# Patient Record
Sex: MALE | Race: AMERICAN INDIAN OR ALASKA NATIVE | NOT HISPANIC OR LATINO | Employment: UNEMPLOYED | URBAN - METROPOLITAN AREA
[De-identification: names, ages, dates, MRNs, and addresses within clinical notes are randomized per-mention and may not be internally consistent; named-entity substitution may affect disease eponyms.]

---

## 2017-08-15 ENCOUNTER — HOSPITAL ENCOUNTER (EMERGENCY)
Facility: MEDICAL CENTER | Age: 52
End: 2017-08-16
Attending: EMERGENCY MEDICINE
Payer: OTHER GOVERNMENT

## 2017-08-15 DIAGNOSIS — R11.2 NON-INTRACTABLE VOMITING WITH NAUSEA, UNSPECIFIED VOMITING TYPE: ICD-10-CM

## 2017-08-15 DIAGNOSIS — R10.10 PAIN OF UPPER ABDOMEN: ICD-10-CM

## 2017-08-15 PROCEDURE — 99284 EMERGENCY DEPT VISIT MOD MDM: CPT

## 2017-08-15 ASSESSMENT — PAIN SCALES - GENERAL: PAINLEVEL_OUTOF10: 9

## 2017-08-15 NOTE — ED AVS SNAPSHOT
Home Care Instructions                                                                                                                Reji Diana   MRN: [id number]    Department:  [facility], Emergency Dept   Date of Visit:  8/15/2017            [facility], Emergency Dept    [address] [place] [id number]    Phone:  [phone]      You were seen by     Allie Garcia M.D.      Your Diagnosis Was     Pain of upper abdomen     R10.10       These are the medications you received during your hospitalization from 08/15/2017 2313 to 08/16/2017 0318     Date/Time Order Dose Route Action    08/16/2017 0217 NS infusion 1,000 mL 1,000 mL Intravenous New Bag    08/16/2017 0213 HYDROmorphone (DILAUDID) injection 1 mg 1 mg Intravenous Given    08/16/2017 0213 ondansetron (ZOFRAN) syringe/vial injection 4 mg 4 mg Intravenous Given      Follow-up Information     1. Call [facility].    Why:  Please call at 8:00 this morning for follow-up appointment    Contact information    [address] [place] [address] [phone]        2. Go to [facility], Emergency Dept.    Specialty:  Emergency Medicine    Why:  If symptoms worsen    Contact information    [address] [place] [id number] [phone]      Medication Information     Review all of your home medications and newly ordered medications with your primary doctor and/or pharmacist as soon as possible. Follow medication instructions as directed by your doctor and/or pharmacist.     Please keep your complete medication list with you and share with your physician. Update the information when medications are discontinued, doses are changed, or new medications (including over-the-counter products) are added; and carry medication information at all times in the event of emergency situations.               Medication List      ASK your doctor about these medications        Instructions    Morning Afternoon Evening Bedtime    azithromycin 250 MG Tabs   Commonly known as:  ZITHROMAX        1 po q day for 4 days                        benzonatate 200 MG capsule   Commonly known as:  TESSALON        Take 1 Cap by mouth 3 times a day as needed for Cough.   Dose:  200 mg                        clindamycin 150 MG Caps   Commonly known as:  CLEOCIN        Take 1 Cap by mouth 4 times a day.   Dose:  150 mg                        * hydrocodone-acetaminophen 7.5-750 MG per tablet   Commonly known as:  VICODIN ES        Take 1 Tab by mouth every four hours as needed for Mild Pain (pain).   Dose:  1 Tab                        * hydrocodone-acetaminophen 5-325 MG Tabs per tablet   Commonly known as:  NORCO        Take 1-2 Tabs by mouth every 6 hours as needed.   Dose:  1-2 Tab                        * hydrocodone-acetaminophen 5-325 MG Tabs per tablet   Commonly known as:  NORCO        Take 1-2 Tabs by mouth every 6 hours as needed.   Dose:  1-2 Tab                        * Notice:  This list has 3 medication(s) that are the same as other medications prescribed for you. Read the directions carefully, and ask your doctor or other care provider to review them with you.            Procedures and tests performed during your visit     CBC WITH DIFFERENTIAL    COMP METABOLIC PANEL    CREATINE KINASE    ESTIMATED GFR    IV Saline Lock    LIPASE        Discharge Instructions       Please call your primary care clinic this morning for follow-up appointment and complete recheck within 24-48 hours. Return to the emergency department if he develops worsening symptoms including recurrent abdominal pain, or vomiting returns, if he develops fevers, or if you develop any new or worsening symptoms.      Abdominal Pain, Adult  Many things can cause belly (abdominal) pain. Most times, the belly pain is not dangerous. Many cases of belly pain can be watched and treated at home.  HOME CARE   · Do not take medicines that help you go  poop (laxatives) unless told to by your doctor.  · Only take medicine as told by your doctor.  · Eat or drink as told by your doctor. Your doctor will tell you if you should be on a special diet.  GET HELP IF:  · You do not know what is causing your belly pain.  · You have belly pain while you are sick to your stomach (nauseous) or have runny poop (diarrhea).  · You have pain while you pee or poop.  · Your belly pain wakes you up at night.  · You have belly pain that gets worse or better when you eat.  · You have belly pain that gets worse when you eat fatty foods.  · You have a fever.  GET HELP RIGHT AWAY IF:   · The pain does not go away within 2 hours.  · You keep throwing up (vomiting).  · The pain changes and is only in the right or left part of the belly.  · You have bloody or tarry looking poop.  MAKE SURE YOU:   · Understand these instructions.  · Will watch your condition.  · Will get help right away if you are not doing well or get worse.     This information is not intended to replace advice given to you by your health care provider. Make sure you discuss any questions you have with your health care provider.     Document Released: 06/05/2009 Document Revised: 01/08/2016 Document Reviewed: 08/27/2014  [employer] Interactive Patient Education ©2016 [employer]            Patient Information     Patient Information    Following emergency treatment: all patient requiring follow-up care must return either to a private physician or a clinic if your condition worsens before you are able to obtain further medical attention, please return to the emergency room.     Billing Information    At [facility], we work to make the billing process streamlined for our patients.  Our Representatives are here to answer any questions you may have regarding your hospital bill.  If you have insurance coverage and have supplied your insurance information to us, we will submit a claim to your insurer on your behalf.  Should you  have any questions regarding your bill, we can be reached online or by phone as follows:  Online: You are able pay your bills online or live chat with our representatives about any billing questions you may have. We are here to help Monday - Friday from 8:00am to 7:30pm and 9:00am - 12:00pm on Saturdays.  Please visit [email or web address]  for more information.   Phone:  [phone] or [phone]    Please note that your emergency physician, surgeon, pathologist, radiologist, anesthesiologist, and other specialists are not employed by [facility] and will therefore bill separately for their services.  Please contact them directly for any questions concerning their bills at the numbers below:     Emergency Physician Services:  [phone]  [facility]:  [phone]  [facility]:  [phone]  [facility]:  [phone]    1. Your final bill may vary from the amount quoted upon discharge if all procedures are not complete at that time, or if your doctor has additional procedures of which we are not aware. You will receive an additional bill if you return to the Emergency Department at [facility] for suture removal regardless of the facility of which the sutures were placed.     2. Please arrange for settlement of this account at the emergency registration.    3. All self-pay accounts are due in full at the time of treatment.  If you are unable to meet this obligation then payment is expected within 4-5 days.     4. If you have had radiology studies (CT, X-ray, Ultrasound, MRI), you have received a preliminary result during your emergency department visit. Please contact the radiology department [phone] to receive a copy of your final result. Please discuss the Final result with your primary physician or with the follow up physician provided.     Crisis Hotline:  National Crisis Hotline:  8-320-USSPZFS or  1-870.392.5074  [place] Crisis Hotline:    1-798.156.8670 or 759-974-8708         ED Discharge Follow Up Questions    1. In order to provide you with very good care, we would like to follow up with a phone call in the next few days.  May we have your permission to contact you?     YES /  NO    2. What is the best phone number to call you? (       )_____-__________    3. What is the best time to call you?      Morning  /  Afternoon  /  Evening                   Patient Signature:  ____________________________________________________________    Date:  ____________________________________________________________

## 2017-08-15 NOTE — ED AVS SNAPSHOT
cityguru Access Code: [email or web address]  Expires: 9/15/2017  3:17 AM    cityguru  A secure, online tool to manage your health information     [employer]’s cityguru® is a secure, online tool that connects you to your personalized health information from the privacy of your home -- day or night - making it very easy for you to manage your healthcare. Once the activation process is completed, you can even access your medical information using the cityguru kit, which is available for free in the Apple Kit store or Google Play store.     cityguru provides the following levels of access (as shown below):   My Chart Features   [facility] Primary Care Doctor [facility]  Specialists [facility]  Urgent  Care Non-[facility]  Primary Care  Doctor   Email your healthcare team securely and privately 24/7 X X X X   Manage appointments: schedule your next appointment; view details of past/upcoming appointments X      Request prescription refills. X      View recent personal medical records, including lab and immunizations X X X X   View health record, including health history, allergies, medications X X X X   Read reports about your outpatient visits, procedures, consult and ER notes X X X X   See your discharge summary, which is a recap of your hospital and/or ER visit that includes your diagnosis, lab results, and care plan. X X       How to register for cityguru:  1. Go to  [email or web address].  2. Click on the Sign Up Now box, which takes you to the New Member Sign Up page. You will need to provide the following information:  a. Enter your cityguru Access Code exactly as it appears at the top of this page. (You will not need to use this code after you’ve completed the sign-up process. If you do not sign up before the expiration date, you must request a new code.)   b. Enter your date of birth.   c. Enter your home email address.   d. Click Submit, and follow the next screen’s instructions.  3. Create a cityguru ID. This will be your cityguru  login ID and cannot be changed, so think of one that is secure and easy to remember.  4. Create a [employer] password. You can change your password at any time.  5. Enter your Password Reset Question and Answer. This can be used at a later time if you forget your password.   6. Enter your e-mail address. This allows you to receive e-mail notifications when new information is available in [employer].  7. Click Sign Up. You can now view your health information.    For assistance activating your [employer] account, call [phone]

## 2017-08-15 NOTE — ED AVS SNAPSHOT
8/16/2017    Reji Diana  [address] [address]    Dear Reji:    [facility] wants to ensure your discharge home is safe and you or your loved ones have had all of your questions answered regarding your care after you leave the hospital.    Below is a list of resources and contact information should you have any questions regarding your hospital stay, follow-up instructions, or active medical symptoms.    Questions or Concerns Regarding… Contact   Medical Questions Related to Your Discharge  (7 days a week, 8am-5pm) Contact a Nurse Care Coordinator   [phone]   Medical Questions Not Related to Your Discharge  (24 hours a day / 7 days a week)  Contact the Nurse Health Line   [phone]    Medications or Discharge Instructions Refer to your discharge packet   or contact your [facility] Primary Care Provider   [phone]   Follow-up Appointment(s) Schedule your appointment via [employer]   or contact Scheduling [phone]   Billing Review your statement via [employer]  or contact Billing [phone]   Medical Records Review your records via [employer]   or contact Medical Records [phone]     You may receive a telephone call within two days of discharge. This call is to make certain you understand your discharge instructions and have the opportunity to have any questions answered. You can also easily access your medical information, test results and upcoming appointments via the [employer] free online health management tool. You can learn more and sign up at [employer]. For assistance setting up your [employer] account, please call [phone].    Once again, we want to ensure your discharge home is safe and that you have a clear understanding of any next steps in your care. If you have any questions or concerns, please do not hesitate to contact us, we are here for you. Thank you for choosing [facility] for your healthcare needs.    Sincerely,    Your [facility] Healthcare Team

## 2017-08-16 VITALS
WEIGHT: 154.54 LBS | RESPIRATION RATE: 18 BRPM | HEART RATE: 91 BPM | OXYGEN SATURATION: 94 % | HEIGHT: 72 IN | BODY MASS INDEX: 20.93 KG/M2 | DIASTOLIC BLOOD PRESSURE: 87 MMHG | SYSTOLIC BLOOD PRESSURE: 149 MMHG | TEMPERATURE: 97.6 F

## 2017-08-16 LAB
ALBUMIN SERPL BCP-MCNC: 3.8 G/DL (ref 3.2–4.9)
ALBUMIN/GLOB SERPL: 1.1 G/DL
ALP SERPL-CCNC: 85 U/L (ref 30–99)
ALT SERPL-CCNC: 47 U/L (ref 2–50)
ANION GAP SERPL CALC-SCNC: 4 MMOL/L (ref 0–11.9)
AST SERPL-CCNC: 39 U/L (ref 12–45)
BASOPHILS # BLD AUTO: 0.2 % (ref 0–1.8)
BASOPHILS # BLD: 0.02 K/UL (ref 0–0.12)
BILIRUB SERPL-MCNC: 0.8 MG/DL (ref 0.1–1.5)
BUN SERPL-MCNC: 22 MG/DL (ref 8–22)
CALCIUM SERPL-MCNC: 9.3 MG/DL (ref 8.5–10.5)
CHLORIDE SERPL-SCNC: 108 MMOL/L (ref 96–112)
CK SERPL-CCNC: 57 U/L (ref 0–154)
CO2 SERPL-SCNC: 26 MMOL/L (ref 20–33)
CREAT SERPL-MCNC: 0.73 MG/DL (ref 0.5–1.4)
EOSINOPHIL # BLD AUTO: 0.05 K/UL (ref 0–0.51)
EOSINOPHIL NFR BLD: 0.4 % (ref 0–6.9)
ERYTHROCYTE [DISTWIDTH] IN BLOOD BY AUTOMATED COUNT: 43.2 FL (ref 35.9–50)
GFR SERPL CREATININE-BSD FRML MDRD: >60 ML/MIN/1.73 M 2
GLOBULIN SER CALC-MCNC: 3.4 G/DL (ref 1.9–3.5)
GLUCOSE SERPL-MCNC: 108 MG/DL (ref 65–99)
HCT VFR BLD AUTO: 44.9 % (ref 42–52)
HGB BLD-MCNC: 15.3 G/DL (ref 14–18)
IMM GRANULOCYTES # BLD AUTO: 0.07 K/UL (ref 0–0.11)
IMM GRANULOCYTES NFR BLD AUTO: 0.6 % (ref 0–0.9)
LIPASE SERPL-CCNC: 35 U/L (ref 11–82)
LYMPHOCYTES # BLD AUTO: 0.83 K/UL (ref 1–4.8)
LYMPHOCYTES NFR BLD: 6.6 % (ref 22–41)
MCH RBC QN AUTO: 30.4 PG (ref 27–33)
MCHC RBC AUTO-ENTMCNC: 34.1 G/DL (ref 33.7–35.3)
MCV RBC AUTO: 89.3 FL (ref 81.4–97.8)
MONOCYTES # BLD AUTO: 0.7 K/UL (ref 0–0.85)
MONOCYTES NFR BLD AUTO: 5.6 % (ref 0–13.4)
NEUTROPHILS # BLD AUTO: 10.9 K/UL (ref 1.82–7.42)
NEUTROPHILS NFR BLD: 86.6 % (ref 44–72)
NRBC # BLD AUTO: 0 K/UL
NRBC BLD AUTO-RTO: 0 /100 WBC
PLATELET # BLD AUTO: 171 K/UL (ref 164–446)
PMV BLD AUTO: 11.3 FL (ref 9–12.9)
POTASSIUM SERPL-SCNC: 5 MMOL/L (ref 3.6–5.5)
PROT SERPL-MCNC: 7.2 G/DL (ref 6–8.2)
RBC # BLD AUTO: 5.03 M/UL (ref 4.7–6.1)
SODIUM SERPL-SCNC: 138 MMOL/L (ref 135–145)
WBC # BLD AUTO: 12.6 K/UL (ref 4.8–10.8)

## 2017-08-16 PROCEDURE — 82550 ASSAY OF CK (CPK): CPT

## 2017-08-16 PROCEDURE — 700111 HCHG RX REV CODE 636 W/ 250 OVERRIDE (IP): Performed by: EMERGENCY MEDICINE

## 2017-08-16 PROCEDURE — 83690 ASSAY OF LIPASE: CPT

## 2017-08-16 PROCEDURE — 96375 TX/PRO/DX INJ NEW DRUG ADDON: CPT

## 2017-08-16 PROCEDURE — 85025 COMPLETE CBC W/AUTO DIFF WBC: CPT

## 2017-08-16 PROCEDURE — 96374 THER/PROPH/DIAG INJ IV PUSH: CPT

## 2017-08-16 PROCEDURE — 700105 HCHG RX REV CODE 258: Performed by: EMERGENCY MEDICINE

## 2017-08-16 PROCEDURE — 80053 COMPREHEN METABOLIC PANEL: CPT

## 2017-08-16 RX ORDER — ONDANSETRON 2 MG/ML
4 INJECTION INTRAMUSCULAR; INTRAVENOUS ONCE
Status: COMPLETED | OUTPATIENT
Start: 2017-08-16 | End: 2017-08-16

## 2017-08-16 RX ORDER — SODIUM CHLORIDE 9 MG/ML
1000 INJECTION, SOLUTION INTRAVENOUS ONCE
Status: COMPLETED | OUTPATIENT
Start: 2017-08-16 | End: 2017-08-16

## 2017-08-16 RX ADMIN — ONDANSETRON 4 MG: 2 INJECTION INTRAMUSCULAR; INTRAVENOUS at 02:13

## 2017-08-16 RX ADMIN — SODIUM CHLORIDE 1000 ML: 9 INJECTION, SOLUTION INTRAVENOUS at 02:17

## 2017-08-16 RX ADMIN — HYDROMORPHONE HYDROCHLORIDE 1 MG: 1 INJECTION, SOLUTION INTRAMUSCULAR; INTRAVENOUS; SUBCUTANEOUS at 02:13

## 2017-08-16 NOTE — ED PROVIDER NOTES
"ED Provider Note    Scribed for Allie Garcia M.D. by Desmond Boyer. 8/16/2017, 1:26 AM.    Primary care provider: Pcp Pt States None  Means of arrival: walk-in  History obtained from: patient, patient's friend  History limited by: none    CHIEF COMPLAINT  Chief Complaint   Patient presents with   • Epigastric Pain     since aprox 2100. 9/10 \"twisting\" nonradiating pain   • Vomiting     Pt reports aprox 6-7 episodes of emesis       HPI  Reji Diana is a 52 y.o. male who presents to the Emergency Department for evaluation of abdominal pain onset 9:00 PM tonight. Per patient, his pain was sudden onset. His pain is across his entire abdomen, same on the left and right side of his abdomen. The patient rates his current pain as severe, and describes it as a \"twisting\" pain. He endorses assocaited nausea and vomiting. The patient reports laying on his side improves his pain, but does not endorse any exacerbating factors. He has never experienced this type of abdominal pain. Patient's friend notes the patient has been in the sun a lot recently. The patient confirms he has been urinating normally. Patient does not take any daily medications. He denies fevers, diarrhea, rash. The patient also denies marijuana use.    REVIEW OF SYSTEMS  Pertinent positives include abdominal pain, nausea, vomiting. Pertinent negatives include no marijuana use, fevers, diarrhea, rash. See HPI for further details. All other systems reviewed and negative.    C.    PAST MEDICAL HISTORY   has a past medical history of Seizure disorder (CMS-HCC).    SURGICAL HISTORY  patient denies any surgical history    SOCIAL HISTORY  Social History   Substance Use Topics   • Smoking status: Current Every Day Smoker -- 0.50 packs/day   • Smokeless tobacco: None      Comment: 1/2 ppd   • Alcohol Use: No      History   Drug Use   • Yes   • Special: Inhaled     Comment: meth       FAMILY HISTORY  History reviewed. No pertinent family history.    CURRENT " "MEDICATIONS  No current facility-administered medications on file prior to encounter.     Current Outpatient Prescriptions on File Prior to Encounter   Medication Sig Dispense Refill   • azithromycin (ZITHROMAX) 250 MG Tab 1 po q day for 4 days 4 Tab 0   • benzonatate (TESSALON) 200 MG capsule Take 1 Cap by mouth 3 times a day as needed for Cough. 30 Cap 1   • hydrocodone-acetaminophen (NORCO) 5-325 MG Tab per tablet Take 1-2 Tabs by mouth every 6 hours as needed. 20 Tab 0   • hydrocodone-acetaminophen (NORCO) 5-325 MG Tab per tablet Take 1-2 Tabs by mouth every 6 hours as needed. 20 Tab 0   • clindamycin (CLEOCIN) 150 MG CAPS Take 1 Cap by mouth 4 times a day. 28 Cap 0   • hydrocodone-acetaminophen (VICODIN ES) 7.5-750 MG per tablet Take 1 Tab by mouth every four hours as needed for Mild Pain (pain). 15 Each 0      ALLERGIES  No Known Allergies    PHYSICAL EXAM  Vital Signs: /87 mmHg  Pulse 115  Temp(Src) 36.4 °C (97.6 °F) (Temporal)  Resp 22  Ht 1.829 m (6' 0.01\")  Wt 70.1 kg (154 lb 8.7 oz)  BMI 20.96 kg/m2  SpO2 99%  Constitutional: Alert, no acute distress   HENT: Normocephalic, atraumatic, moist mucus membranes  Eyes: Pupils equal and reactive, normal conjunctiva, non-icteric  Neck: Supple, normal range of motion, no stridor  Cardiovascular: Tachycardic rate, Regular rhythm, Normal peripheral perfusion, no cyanosis, Normal cardiac auscultation  Pulmonary: No respiratory distress, normal work of breathing, no accessory muscle usage, Clear to auscultation bilaterally  Abdomen: Soft, moderate tenderness to palpation in right upper, left upper, and epigastric areas. no RLQ tenderness. No peritoneal signs, bowel sounds are present.   Skin: Warm, dry, no rashes or lesions  Back: No pain with active range of motion  Musculoskeletal: Normal range of motion in all extremities, no swelling or deformity noted  Neurologic: Alert, oriented, normal motor function, no speech deficits  Psychiatric: Normal and " appropriate mood and affect    DIAGNOSTIC STUDIES/PROCEDURES:    LABS  Labs Reviewed   CBC WITH DIFFERENTIAL - Abnormal; Notable for the following:     WBC 12.6 (*)     Neutrophils-Polys 86.60 (*)     Lymphocytes 6.60 (*)     Neutrophils (Absolute) 10.90 (*)     Lymphs (Absolute) 0.83 (*)     All other components within normal limits   COMP METABOLIC PANEL - Abnormal; Notable for the following:     Glucose 108 (*)     All other components within normal limits   LIPASE   CREATINE KINASE   ESTIMATED GFR   URINALYSIS,CULTURE IF INDICATED     All labs reviewed by me.    COURSE & MEDICAL DECISION MAKING  Pertinent Labs & Imaging studies reviewed. (See chart for details)    Differential diagnoses include but are not limited to: Pancreatitis, cholecystitis, cholelithiasis, viral illness, gastroenteritis, dehydration, likely abnormality    1:05 AM - Patient seen and evaluated at bedside. Patient will be treated with 1000 mL NS infusion, 1 mg Dilaudid, 4 mg Zofran. The patient is given IV fluids secondary to clinical signs of dehydration. Ordered CBC with differential, CMP, Lipase, UA culture if indicated, Creatine Kinase to evaluate the patient's symptoms. I discussed the treatment plan as above with the patient. He understood and verbalized agreement.      Decision Making:  This is a 52 y.o. year old male who presents with epigastric pain nausea and vomiting. Pain is localized to the upper abdomen, reproducible on palpation, no chest pain, no shortness of breath, doubt cardiopulmonary etiology. He does have some mild discomfort on abdominal palpation but no peritoneal signs, no masses suggestive of hernia. He has no overlying skin changes. No right lower quadrant tenderness to palpation, less concerning for appendicitis. Pain is not localized to the right upper quadrant, less concerning for cholecystitis or cholelithiasis.    On laboratory evaluation is a mildly elevated white blood count of 12.6, no bands resulted this  time. No significant electrolyte abnormalities, glucose is within normal limits at 108. I cases 35 without evidence of pancreatitis. CPK is 57 without evidence of rhabdomyolysis. Normal liver enzymes, normal total bilirubin, no evidence of obstructive biliary process.    He was treated with a fluid bolus, Zofran and Dilaudid on arrival to the emergency department. On my reassessment his pain has resolved. He is able to tolerate by mouth fluids, states that he is now asymptomatic. Repeat abdominal exam remains benign. At this time, etiology of his abdominal pain is unclear. Resting heart rate is 92 on my reassessment. He remains without hypotension, is afebrile.    Plan at this time is for discharge home with primary care follow-up. He will contact his primary care physician today for abdominal recheck within 24-48 hours. He will return to the emergency department with any new or worsening symptoms, he will return immediately for abdominal recheck if his pain recurs, or if he develops fevers, recurrent vomiting or any further concerns.    Discharge home in stable condition    FINAL IMPRESSION  1. Pain of upper abdomen    2. Non-intractable vomiting with nausea, unspecified vomiting type          I, Desmond Boyer (Scribe), am scribing for, and in the presence of, Allie Garcia M.D..    Electronically signed by: Desmond Boyer (Juan Carlos), 8/16/2017    IAllie M.D. personally performed the services described in this documentation, as scribed by Desmond Boyer in my presence, and it is both accurate and complete.    The note accurately reflects work and decisions made by me.  Allie Garcia  8/16/2017  3:07 AM

## 2017-08-16 NOTE — DISCHARGE INSTRUCTIONS
Please call your primary care clinic this morning for follow-up appointment and complete recheck within 24-48 hours. Return to the emergency department if he develops worsening symptoms including recurrent abdominal pain, or vomiting returns, if he develops fevers, or if you develop any new or worsening symptoms.      Abdominal Pain, Adult  Many things can cause belly (abdominal) pain. Most times, the belly pain is not dangerous. Many cases of belly pain can be watched and treated at home.  HOME CARE   · Do not take medicines that help you go poop (laxatives) unless told to by your doctor.  · Only take medicine as told by your doctor.  · Eat or drink as told by your doctor. Your doctor will tell you if you should be on a special diet.  GET HELP IF:  · You do not know what is causing your belly pain.  · You have belly pain while you are sick to your stomach (nauseous) or have runny poop (diarrhea).  · You have pain while you pee or poop.  · Your belly pain wakes you up at night.  · You have belly pain that gets worse or better when you eat.  · You have belly pain that gets worse when you eat fatty foods.  · You have a fever.  GET HELP RIGHT AWAY IF:   · The pain does not go away within 2 hours.  · You keep throwing up (vomiting).  · The pain changes and is only in the right or left part of the belly.  · You have bloody or tarry looking poop.  MAKE SURE YOU:   · Understand these instructions.  · Will watch your condition.  · Will get help right away if you are not doing well or get worse.     This information is not intended to replace advice given to you by your health care provider. Make sure you discuss any questions you have with your health care provider.     Document Released: 06/05/2009 Document Revised: 01/08/2016 Document Reviewed: 08/27/2014  [employer] Interactive Patient Education ©2016 [employer]

## 2017-08-16 NOTE — ED NOTES
"Reji Becerrao  52 y.o. male  Chief Complaint   Patient presents with   • Epigastric Pain     since aprox 2100. 9/10 \"twisting\" nonradiating pain   • Vomiting     Pt reports aprox 6-7 episodes of emesis       Pt amb to triage via wheelchair for above complaint. Pt began to experience s/s after a \"long bike ride.\" Pt appears in pain  Pt is alert and oriented, speaking in full sentences, follows commands and responds appropriately to questions. NAD. Resp are even and unlabored.  Pt placed in lobby. Pt educated on triage process. Pt encouraged to alert staff for any changes.    "

## 2022-04-13 ENCOUNTER — APPOINTMENT (OUTPATIENT)
Dept: RADIOLOGY | Facility: MEDICAL CENTER | Age: 57
DRG: 137 | End: 2022-04-13
Attending: EMERGENCY MEDICINE
Payer: COMMERCIAL

## 2022-04-13 ENCOUNTER — HOSPITAL ENCOUNTER (INPATIENT)
Facility: MEDICAL CENTER | Age: 57
LOS: 1 days | DRG: 137 | End: 2022-04-14
Attending: EMERGENCY MEDICINE | Admitting: STUDENT IN AN ORGANIZED HEALTH CARE EDUCATION/TRAINING PROGRAM
Payer: COMMERCIAL

## 2022-04-13 ENCOUNTER — ANESTHESIA EVENT (OUTPATIENT)
Dept: SURGERY | Facility: MEDICAL CENTER | Age: 57
DRG: 137 | End: 2022-04-13
Payer: COMMERCIAL

## 2022-04-13 ENCOUNTER — ANESTHESIA (OUTPATIENT)
Dept: SURGERY | Facility: MEDICAL CENTER | Age: 57
DRG: 137 | End: 2022-04-13
Payer: COMMERCIAL

## 2022-04-13 DIAGNOSIS — E87.1 HYPONATREMIA: ICD-10-CM

## 2022-04-13 DIAGNOSIS — K04.90 ENDODONTIC DISEASE: ICD-10-CM

## 2022-04-13 DIAGNOSIS — L03.211 FACIAL CELLULITIS: ICD-10-CM

## 2022-04-13 DIAGNOSIS — K04.7 DENTAL ABSCESS: ICD-10-CM

## 2022-04-13 DIAGNOSIS — K04.7 DENTAL INFECTION: ICD-10-CM

## 2022-04-13 DIAGNOSIS — Z72.0 TOBACCO USE: ICD-10-CM

## 2022-04-13 LAB
ANION GAP SERPL CALC-SCNC: 11 MMOL/L (ref 7–16)
BASOPHILS # BLD AUTO: 0.3 % (ref 0–1.8)
BASOPHILS # BLD: 0.02 K/UL (ref 0–0.12)
BUN SERPL-MCNC: 17 MG/DL (ref 8–22)
CALCIUM SERPL-MCNC: 9.1 MG/DL (ref 8.5–10.5)
CHLORIDE SERPL-SCNC: 101 MMOL/L (ref 96–112)
CO2 SERPL-SCNC: 21 MMOL/L (ref 20–33)
CREAT SERPL-MCNC: 0.42 MG/DL (ref 0.5–1.4)
EOSINOPHIL # BLD AUTO: 0.02 K/UL (ref 0–0.51)
EOSINOPHIL NFR BLD: 0.3 % (ref 0–6.9)
ERYTHROCYTE [DISTWIDTH] IN BLOOD BY AUTOMATED COUNT: 41.9 FL (ref 35.9–50)
GFR SERPLBLD CREATININE-BSD FMLA CKD-EPI: 126 ML/MIN/1.73 M 2
GLUCOSE SERPL-MCNC: 104 MG/DL (ref 65–99)
HCT VFR BLD AUTO: 47 % (ref 42–52)
HGB BLD-MCNC: 16.1 G/DL (ref 14–18)
IMM GRANULOCYTES # BLD AUTO: 0.02 K/UL (ref 0–0.11)
IMM GRANULOCYTES NFR BLD AUTO: 0.3 % (ref 0–0.9)
LYMPHOCYTES # BLD AUTO: 1.63 K/UL (ref 1–4.8)
LYMPHOCYTES NFR BLD: 21.1 % (ref 22–41)
MCH RBC QN AUTO: 29.5 PG (ref 27–33)
MCHC RBC AUTO-ENTMCNC: 34.3 G/DL (ref 33.7–35.3)
MCV RBC AUTO: 86.2 FL (ref 81.4–97.8)
MONOCYTES # BLD AUTO: 1.08 K/UL (ref 0–0.85)
MONOCYTES NFR BLD AUTO: 14 % (ref 0–13.4)
NEUTROPHILS # BLD AUTO: 4.95 K/UL (ref 1.82–7.42)
NEUTROPHILS NFR BLD: 64 % (ref 44–72)
NRBC # BLD AUTO: 0 K/UL
NRBC BLD-RTO: 0 /100 WBC
PLATELET # BLD AUTO: 107 K/UL (ref 164–446)
PMV BLD AUTO: 10.8 FL (ref 9–12.9)
POTASSIUM SERPL-SCNC: 4.2 MMOL/L (ref 3.6–5.5)
RBC # BLD AUTO: 5.45 M/UL (ref 4.7–6.1)
SARS-COV+SARS-COV-2 AG RESP QL IA.RAPID: NOTDETECTED
SODIUM SERPL-SCNC: 133 MMOL/L (ref 135–145)
SPECIMEN SOURCE: NORMAL
WBC # BLD AUTO: 7.7 K/UL (ref 4.8–10.8)

## 2022-04-13 PROCEDURE — 700105 HCHG RX REV CODE 258: Performed by: ANESTHESIOLOGY

## 2022-04-13 PROCEDURE — 700101 HCHG RX REV CODE 250: Performed by: ANESTHESIOLOGY

## 2022-04-13 PROCEDURE — 700117 HCHG RX CONTRAST REV CODE 255: Performed by: EMERGENCY MEDICINE

## 2022-04-13 PROCEDURE — 700105 HCHG RX REV CODE 258: Performed by: EMERGENCY MEDICINE

## 2022-04-13 PROCEDURE — 501838 HCHG SUTURE GENERAL: Performed by: ORAL & MAXILLOFACIAL SURGERY

## 2022-04-13 PROCEDURE — 96365 THER/PROPH/DIAG IV INF INIT: CPT

## 2022-04-13 PROCEDURE — 99291 CRITICAL CARE FIRST HOUR: CPT

## 2022-04-13 PROCEDURE — 160027 HCHG SURGERY MINUTES - 1ST 30 MINS LEVEL 2: Performed by: ORAL & MAXILLOFACIAL SURGERY

## 2022-04-13 PROCEDURE — 70355 PANORAMIC X-RAY OF JAWS: CPT

## 2022-04-13 PROCEDURE — 80048 BASIC METABOLIC PNL TOTAL CA: CPT

## 2022-04-13 PROCEDURE — 700105 HCHG RX REV CODE 258: Performed by: STUDENT IN AN ORGANIZED HEALTH CARE EDUCATION/TRAINING PROGRAM

## 2022-04-13 PROCEDURE — 770001 HCHG ROOM/CARE - MED/SURG/GYN PRIV*

## 2022-04-13 PROCEDURE — 160038 HCHG SURGERY MINUTES - EA ADDL 1 MIN LEVEL 2: Performed by: ORAL & MAXILLOFACIAL SURGERY

## 2022-04-13 PROCEDURE — 160035 HCHG PACU - 1ST 60 MINS PHASE I: Performed by: ORAL & MAXILLOFACIAL SURGERY

## 2022-04-13 PROCEDURE — 160009 HCHG ANES TIME/MIN: Performed by: ORAL & MAXILLOFACIAL SURGERY

## 2022-04-13 PROCEDURE — 85025 COMPLETE CBC W/AUTO DIFF WBC: CPT

## 2022-04-13 PROCEDURE — 36415 COLL VENOUS BLD VENIPUNCTURE: CPT

## 2022-04-13 PROCEDURE — 99222 1ST HOSP IP/OBS MODERATE 55: CPT | Performed by: STUDENT IN AN ORGANIZED HEALTH CARE EDUCATION/TRAINING PROGRAM

## 2022-04-13 PROCEDURE — 96375 TX/PRO/DX INJ NEW DRUG ADDON: CPT

## 2022-04-13 PROCEDURE — 160036 HCHG PACU - EA ADDL 30 MINS PHASE I: Performed by: ORAL & MAXILLOFACIAL SURGERY

## 2022-04-13 PROCEDURE — 00170 ANES INTRAORAL PX NOS: CPT | Performed by: ANESTHESIOLOGY

## 2022-04-13 PROCEDURE — 700101 HCHG RX REV CODE 250

## 2022-04-13 PROCEDURE — 0CDXXZ1 EXTRACTION OF LOWER TOOTH, MULTIPLE, EXTERNAL APPROACH: ICD-10-PCS | Performed by: ORAL & MAXILLOFACIAL SURGERY

## 2022-04-13 PROCEDURE — 0C940ZZ DRAINAGE OF BUCCAL MUCOSA, OPEN APPROACH: ICD-10-PCS | Performed by: ORAL & MAXILLOFACIAL SURGERY

## 2022-04-13 PROCEDURE — 160002 HCHG RECOVERY MINUTES (STAT): Performed by: ORAL & MAXILLOFACIAL SURGERY

## 2022-04-13 PROCEDURE — 700111 HCHG RX REV CODE 636 W/ 250 OVERRIDE (IP): Performed by: ANESTHESIOLOGY

## 2022-04-13 PROCEDURE — 160048 HCHG OR STATISTICAL LEVEL 1-5: Performed by: ORAL & MAXILLOFACIAL SURGERY

## 2022-04-13 PROCEDURE — 70487 CT MAXILLOFACIAL W/DYE: CPT

## 2022-04-13 PROCEDURE — 700111 HCHG RX REV CODE 636 W/ 250 OVERRIDE (IP): Performed by: EMERGENCY MEDICINE

## 2022-04-13 PROCEDURE — 87426 SARSCOV CORONAVIRUS AG IA: CPT

## 2022-04-13 RX ORDER — LABETALOL HYDROCHLORIDE 5 MG/ML
5 INJECTION, SOLUTION INTRAVENOUS
Status: DISCONTINUED | OUTPATIENT
Start: 2022-04-13 | End: 2022-04-13 | Stop reason: HOSPADM

## 2022-04-13 RX ORDER — MORPHINE SULFATE 4 MG/ML
4 INJECTION INTRAVENOUS ONCE
Status: COMPLETED | OUTPATIENT
Start: 2022-04-13 | End: 2022-04-13

## 2022-04-13 RX ORDER — SODIUM CHLORIDE, SODIUM LACTATE, POTASSIUM CHLORIDE, CALCIUM CHLORIDE 600; 310; 30; 20 MG/100ML; MG/100ML; MG/100ML; MG/100ML
INJECTION, SOLUTION INTRAVENOUS
Status: DISCONTINUED | OUTPATIENT
Start: 2022-04-13 | End: 2022-04-13 | Stop reason: SURG

## 2022-04-13 RX ORDER — DIPHENHYDRAMINE HYDROCHLORIDE 50 MG/ML
12.5 INJECTION INTRAMUSCULAR; INTRAVENOUS
Status: DISCONTINUED | OUTPATIENT
Start: 2022-04-13 | End: 2022-04-13 | Stop reason: HOSPADM

## 2022-04-13 RX ORDER — SODIUM CHLORIDE 9 MG/ML
INJECTION, SOLUTION INTRAVENOUS CONTINUOUS
Status: DISCONTINUED | OUTPATIENT
Start: 2022-04-13 | End: 2022-04-14 | Stop reason: HOSPADM

## 2022-04-13 RX ORDER — ROCURONIUM BROMIDE 10 MG/ML
INJECTION, SOLUTION INTRAVENOUS PRN
Status: DISCONTINUED | OUTPATIENT
Start: 2022-04-13 | End: 2022-04-13 | Stop reason: SURG

## 2022-04-13 RX ORDER — OXYCODONE HCL 5 MG/5 ML
10 SOLUTION, ORAL ORAL
Status: DISCONTINUED | OUTPATIENT
Start: 2022-04-13 | End: 2022-04-13 | Stop reason: HOSPADM

## 2022-04-13 RX ORDER — HYDROMORPHONE HYDROCHLORIDE 1 MG/ML
0.2 INJECTION, SOLUTION INTRAMUSCULAR; INTRAVENOUS; SUBCUTANEOUS
Status: DISCONTINUED | OUTPATIENT
Start: 2022-04-13 | End: 2022-04-13 | Stop reason: HOSPADM

## 2022-04-13 RX ORDER — BISACODYL 10 MG
10 SUPPOSITORY, RECTAL RECTAL
Status: DISCONTINUED | OUTPATIENT
Start: 2022-04-13 | End: 2022-04-14 | Stop reason: HOSPADM

## 2022-04-13 RX ORDER — SODIUM CHLORIDE 9 MG/ML
1000 INJECTION, SOLUTION INTRAVENOUS ONCE
Status: COMPLETED | OUTPATIENT
Start: 2022-04-13 | End: 2022-04-13

## 2022-04-13 RX ORDER — MORPHINE SULFATE 4 MG/ML
1 INJECTION INTRAVENOUS
Status: DISCONTINUED | OUTPATIENT
Start: 2022-04-13 | End: 2022-04-14 | Stop reason: HOSPADM

## 2022-04-13 RX ORDER — HYDROMORPHONE HYDROCHLORIDE 2 MG/ML
INJECTION, SOLUTION INTRAMUSCULAR; INTRAVENOUS; SUBCUTANEOUS PRN
Status: DISCONTINUED | OUTPATIENT
Start: 2022-04-13 | End: 2022-04-13 | Stop reason: SURG

## 2022-04-13 RX ORDER — AMOXICILLIN 250 MG
2 CAPSULE ORAL 2 TIMES DAILY
Status: DISCONTINUED | OUTPATIENT
Start: 2022-04-13 | End: 2022-04-14 | Stop reason: HOSPADM

## 2022-04-13 RX ORDER — MIDAZOLAM HYDROCHLORIDE 1 MG/ML
1 INJECTION INTRAMUSCULAR; INTRAVENOUS
Status: DISCONTINUED | OUTPATIENT
Start: 2022-04-13 | End: 2022-04-13 | Stop reason: HOSPADM

## 2022-04-13 RX ORDER — OXYCODONE HYDROCHLORIDE 5 MG/1
5 TABLET ORAL
Status: DISCONTINUED | OUTPATIENT
Start: 2022-04-13 | End: 2022-04-14 | Stop reason: HOSPADM

## 2022-04-13 RX ORDER — OXYCODONE HCL 5 MG/5 ML
5 SOLUTION, ORAL ORAL
Status: DISCONTINUED | OUTPATIENT
Start: 2022-04-13 | End: 2022-04-13 | Stop reason: HOSPADM

## 2022-04-13 RX ORDER — HYDRALAZINE HYDROCHLORIDE 20 MG/ML
5 INJECTION INTRAMUSCULAR; INTRAVENOUS
Status: DISCONTINUED | OUTPATIENT
Start: 2022-04-13 | End: 2022-04-13 | Stop reason: HOSPADM

## 2022-04-13 RX ORDER — ONDANSETRON 2 MG/ML
4 INJECTION INTRAMUSCULAR; INTRAVENOUS
Status: DISCONTINUED | OUTPATIENT
Start: 2022-04-13 | End: 2022-04-13 | Stop reason: HOSPADM

## 2022-04-13 RX ORDER — HYDROMORPHONE HYDROCHLORIDE 1 MG/ML
0.4 INJECTION, SOLUTION INTRAMUSCULAR; INTRAVENOUS; SUBCUTANEOUS
Status: DISCONTINUED | OUTPATIENT
Start: 2022-04-13 | End: 2022-04-13 | Stop reason: HOSPADM

## 2022-04-13 RX ORDER — LIDOCAINE HYDROCHLORIDE AND EPINEPHRINE 5; 5 MG/ML; UG/ML
INJECTION, SOLUTION INFILTRATION; PERINEURAL
Status: DISCONTINUED | OUTPATIENT
Start: 2022-04-13 | End: 2022-04-13 | Stop reason: HOSPADM

## 2022-04-13 RX ORDER — MEPERIDINE HYDROCHLORIDE 25 MG/ML
12.5 INJECTION INTRAMUSCULAR; INTRAVENOUS; SUBCUTANEOUS
Status: DISCONTINUED | OUTPATIENT
Start: 2022-04-13 | End: 2022-04-13 | Stop reason: HOSPADM

## 2022-04-13 RX ORDER — OXYCODONE HYDROCHLORIDE 5 MG/1
2.5 TABLET ORAL
Status: DISCONTINUED | OUTPATIENT
Start: 2022-04-13 | End: 2022-04-14 | Stop reason: HOSPADM

## 2022-04-13 RX ORDER — HYDROMORPHONE HYDROCHLORIDE 1 MG/ML
0.1 INJECTION, SOLUTION INTRAMUSCULAR; INTRAVENOUS; SUBCUTANEOUS
Status: DISCONTINUED | OUTPATIENT
Start: 2022-04-13 | End: 2022-04-13 | Stop reason: HOSPADM

## 2022-04-13 RX ORDER — POLYETHYLENE GLYCOL 3350 17 G/17G
1 POWDER, FOR SOLUTION ORAL
Status: DISCONTINUED | OUTPATIENT
Start: 2022-04-13 | End: 2022-04-14 | Stop reason: HOSPADM

## 2022-04-13 RX ORDER — NICOTINE 21 MG/24HR
14 PATCH, TRANSDERMAL 24 HOURS TRANSDERMAL
Status: DISCONTINUED | OUTPATIENT
Start: 2022-04-13 | End: 2022-04-14 | Stop reason: HOSPADM

## 2022-04-13 RX ORDER — SODIUM CHLORIDE, SODIUM LACTATE, POTASSIUM CHLORIDE, CALCIUM CHLORIDE 600; 310; 30; 20 MG/100ML; MG/100ML; MG/100ML; MG/100ML
INJECTION, SOLUTION INTRAVENOUS CONTINUOUS
Status: DISCONTINUED | OUTPATIENT
Start: 2022-04-13 | End: 2022-04-13 | Stop reason: HOSPADM

## 2022-04-13 RX ORDER — ACETAMINOPHEN 325 MG/1
650 TABLET ORAL EVERY 6 HOURS PRN
Status: DISCONTINUED | OUTPATIENT
Start: 2022-04-13 | End: 2022-04-14 | Stop reason: HOSPADM

## 2022-04-13 RX ORDER — CEFAZOLIN SODIUM 1 G/3ML
INJECTION, POWDER, FOR SOLUTION INTRAMUSCULAR; INTRAVENOUS PRN
Status: DISCONTINUED | OUTPATIENT
Start: 2022-04-13 | End: 2022-04-13 | Stop reason: SURG

## 2022-04-13 RX ORDER — HALOPERIDOL 5 MG/ML
1 INJECTION INTRAMUSCULAR
Status: DISCONTINUED | OUTPATIENT
Start: 2022-04-13 | End: 2022-04-13 | Stop reason: HOSPADM

## 2022-04-13 RX ORDER — LIDOCAINE HYDROCHLORIDE 20 MG/ML
INJECTION, SOLUTION EPIDURAL; INFILTRATION; INTRACAUDAL; PERINEURAL PRN
Status: DISCONTINUED | OUTPATIENT
Start: 2022-04-13 | End: 2022-04-13 | Stop reason: SURG

## 2022-04-13 RX ADMIN — MORPHINE SULFATE 4 MG: 4 INJECTION INTRAVENOUS at 12:51

## 2022-04-13 RX ADMIN — SUGAMMADEX 200 MG: 100 INJECTION, SOLUTION INTRAVENOUS at 18:15

## 2022-04-13 RX ADMIN — SODIUM CHLORIDE: 9 INJECTION, SOLUTION INTRAVENOUS at 20:59

## 2022-04-13 RX ADMIN — SODIUM CHLORIDE 1000 ML: 9 INJECTION, SOLUTION INTRAVENOUS at 12:51

## 2022-04-13 RX ADMIN — HYDROMORPHONE HYDROCHLORIDE 1 MG: 2 INJECTION INTRAMUSCULAR; INTRAVENOUS; SUBCUTANEOUS at 17:33

## 2022-04-13 RX ADMIN — PROPOFOL 180 MG: 10 INJECTION, EMULSION INTRAVENOUS at 17:35

## 2022-04-13 RX ADMIN — SODIUM CHLORIDE 3 G: 900 INJECTION INTRAVENOUS at 12:54

## 2022-04-13 RX ADMIN — ROCURONIUM BROMIDE 50 MG: 10 INJECTION, SOLUTION INTRAVENOUS at 17:35

## 2022-04-13 RX ADMIN — LIDOCAINE HYDROCHLORIDE 50 MG: 20 INJECTION, SOLUTION EPIDURAL; INFILTRATION; INTRACAUDAL at 17:35

## 2022-04-13 RX ADMIN — HYDROMORPHONE HYDROCHLORIDE 1 MG: 2 INJECTION INTRAMUSCULAR; INTRAVENOUS; SUBCUTANEOUS at 18:19

## 2022-04-13 RX ADMIN — SODIUM CHLORIDE, POTASSIUM CHLORIDE, SODIUM LACTATE AND CALCIUM CHLORIDE: 600; 310; 30; 20 INJECTION, SOLUTION INTRAVENOUS at 17:52

## 2022-04-13 RX ADMIN — IOHEXOL 80 ML: 350 INJECTION, SOLUTION INTRAVENOUS at 14:03

## 2022-04-13 RX ADMIN — CEFAZOLIN 2 G: 330 INJECTION, POWDER, FOR SOLUTION INTRAMUSCULAR; INTRAVENOUS at 17:41

## 2022-04-13 ASSESSMENT — ENCOUNTER SYMPTOMS
WHEEZING: 0
DIARRHEA: 0
HEADACHES: 0
SINUS PAIN: 0
ABDOMINAL PAIN: 0
SPUTUM PRODUCTION: 0
FEVER: 0
CHILLS: 0
BLURRED VISION: 0
CONSTIPATION: 0
PALPITATIONS: 0
NERVOUS/ANXIOUS: 0
NAUSEA: 0
SHORTNESS OF BREATH: 0
DOUBLE VISION: 0
SORE THROAT: 0
MYALGIAS: 0
VOMITING: 0
DIZZINESS: 0
FOCAL WEAKNESS: 0
SORE THROAT: 1
COUGH: 0

## 2022-04-13 ASSESSMENT — PAIN DESCRIPTION - PAIN TYPE
TYPE: SURGICAL PAIN
TYPE: SURGICAL PAIN

## 2022-04-13 ASSESSMENT — PAIN SCALES - GENERAL: PAIN_LEVEL: 3

## 2022-04-13 NOTE — H&P
Surgery Oral History & Physical Note    Date  4/13/2022    Primary Care Physician  Pcp Pt States None    CC  Facial swelling    HPI  This is a 57 y.o. male who presented with right facial swelling that started about 2 days ago with tooth pain that progressed to pain with swallowing.  It extended around the right inferior border and he develops dysphagia as well.  No fevers chills, or other symptoms.    CT scan has been obtained in the ED.  Labs show a white count of 7.7 which is within normal limits.  SARS-CoV-2 testing is negative.  Vitals stable, afebrile.    Past Medical History:   Diagnosis Date   • Seizure disorder (HCC)        History reviewed. No pertinent surgical history.    Current Facility-Administered Medications   Medication Dose Route Frequency Provider Last Rate Last Admin   • morphine 4 MG/ML injection 4 mg  4 mg Intravenous Once Marisol Riggs D.O.       • ampicillin/sulbactam (UNASYN) 3 g in  mL IVPB  3 g Intravenous Once Marisol Riggs D.O.       • NS (BOLUS) infusion 1,000 mL  1,000 mL Intravenous Once Marisol Riggs D.O.         Current Outpatient Medications   Medication Sig Dispense Refill   • azithromycin (ZITHROMAX) 250 MG Tab 1 po q day for 4 days 4 Tab 0   • benzonatate (TESSALON) 200 MG capsule Take 1 Cap by mouth 3 times a day as needed for Cough. 30 Cap 1   • hydrocodone-acetaminophen (NORCO) 5-325 MG Tab per tablet Take 1-2 Tabs by mouth every 6 hours as needed. 20 Tab 0   • hydrocodone-acetaminophen (NORCO) 5-325 MG Tab per tablet Take 1-2 Tabs by mouth every 6 hours as needed. 20 Tab 0   • clindamycin (CLEOCIN) 150 MG CAPS Take 1 Cap by mouth 4 times a day. 28 Cap 0   • hydrocodone-acetaminophen (VICODIN ES) 7.5-750 MG per tablet Take 1 Tab by mouth every four hours as needed for Mild Pain (pain). 15 Each 0       Social History     Socioeconomic History   • Marital status: Single     Spouse name: Not on file   • Number of children: Not on file   • Years of education: Not on  file   • Highest education level: Not on file   Occupational History   • Not on file   Tobacco Use   • Smoking status: Current Every Day Smoker     Packs/day: 0.50   • Smokeless tobacco: Never Used   • Tobacco comment: 1/2 ppd   Vaping Use   • Vaping Use: Never used   Substance and Sexual Activity   • Alcohol use: No   • Drug use: Yes     Types: Inhaled     Comment: meth   • Sexual activity: Not on file   Other Topics Concern   • Not on file   Social History Narrative   • Not on file     Social Determinants of Health     Financial Resource Strain: Not on file   Food Insecurity: Not on file   Transportation Needs: Not on file   Physical Activity: Not on file   Stress: Not on file   Social Connections: Not on file   Intimate Partner Violence: Not on file   Housing Stability: Not on file       History reviewed. No pertinent family history.    Allergies  Patient has no known allergies.    Review of Systems  Reviewed and negative except as per HPI    Physical Exam    Vital Signs  Blood Pressure: 134/87   Temperature: 36.7 °C (98 °F)   Pulse: (!) 111   Respiration: 18   Pulse Oximetry: 96 %   General: Awake alert oriented, no acute distress  HEENT: Normocephalic atraumatic  Maxillofacial: Right lower facial third edema and redness.  Tender to touch.  Oral: Mild trismus present.  Partially dentate maxilla and mandible.  Right mandibular vestibule swelling, fluctuant, with exudate seepage.  Gross decay of teeth numbers 30 and 32.  Floor of mouth soft and supple.  Imaging: Panoramic radiograph shows large lucencies in the crowns of teeth numbers 30 and 32.  There is a large periapical lucency on tooth #30.  Maxillofacial CT shows fat stranding in the right buccal space extending into the right submandibular space.  There is a phlegmon in the right mandibular vestibule adjacent to the molars.    Labs:                    Radiology:  [id number]    (Results Pending)   CT-MAXILLOFACIAL WITH PLUS RECONS    (Results  Pending)     HISTORY/REASON FOR EXAM:  Maxillofacial pain.  Right facial/jaw swelling     TECHNIQUE/EXAM DESCRIPTION AND NUMBER OF VIEWS:  CT scan of the maxillofacial with contrast, with reconstructions.     Thin-section helical imaging was obtained of the maxillofacial structures from the orbital roofs through the mandible. Coronal and sagittal multiplanar volume reformat images were generated from the axial data.,     80 mL of Omnipaque 350 nonionic contrast was injected intravenously.     Low dose optimization technique was utilized for this CT exam including automated exposure control and adjustment of the mA and/or kV according to patient size.     COMPARISON:  None.     FINDINGS:  Right facial cutaneous fat stranding involving the cheek, premandibular and submandibular soft tissues and chin consistent with edema/cellulitis. There is asymmetric thickening of the right buccal mucosa.     There were multiple missing teeth. There are multiple dental cavity. There is periapical lucency surrounding the right first mandibular molar suggesting endodontal disease. There is focal low density around the body and angle of the mandible however   without significant peripheral enhancement to suggest a drainable abscess.     Enlarged right submandibular lymph nodes are most likely reactive.     Partially visualized intracranial structures are within normal limits.     Contents are symmetric.     Paranasal sinuses and mastoids are clear.     IMPRESSION:     1.  Dental disease with multiple cavities. Periapical lucency surrounding right first mandibular molar consistent with endodontal disease.  2.  Right facial cellulitis. No drainable abscess is visualized.    Assessment/Plan:   #1 Right buccal and submandibular space cellulitis  #2 Right mandibular vestibule abscess  #3 Advanced unrestorable decay of teeth numbers 30 and 32    This is a 57-year-old male who requires extraction of teeth numbers 30 and 32 and incision and  drainage of multi space infection secondary to these infected teeth.  RBA's discussed, consent signed.  Risks include but are not limited to scarring, pain, bruising, bleeding, nerve injury, and the need for further surgery. Patient is NPO and will proceed to surgery today under general anesthesia and be admitted to a hospitalist service after for IV antibiotics and postoperative cares.    * No surgery found *  * No surgery found *

## 2022-04-13 NOTE — ED TRIAGE NOTES
Chief Complaint   Patient presents with   • Facial Swelling     Pt reports awakening this morning to a large right lower jaw abscess. Pt denies tooth pain or trauma.      /87   Pulse (!) 111   Temp 36.7 °C (98 °F) (Temporal)   Resp 18   Ht 1.829 m (6')   Wt 71.3 kg (157 lb 3 oz)   SpO2 96%   BMI 21.32 kg/m²     Pt is ambulatory in and out of triage. Appropriate PPE worn throughout entire encounter. Pt placed back in the lobby and educated about triage process.

## 2022-04-13 NOTE — ED PROVIDER NOTES
ED Provider Note    ED Provider Note    Primary care provider: Pcp Pt States None  Means of arrival: POV  History obtained from: patient  History limited by: None    CHIEF COMPLAINT  Chief Complaint   Patient presents with   • Facial Swelling     Pt reports awakening this morning to a large right lower jaw abscess. Pt denies tooth pain or trauma.        HPI  Reji Diana is a 57 y.o. male who presents to the Emergency Department with a chief complaint of right facial swelling.  Patient had pain to tooth on the right side 2 days ago but no significant swelling.  He woke up with pain with swallowing and pain with swelling to the right side of his face.  It extends to his submandibular area.  He denies a fever.  He is having trouble swallowing.  He denies any history of diabetes, cancer or IV drug use.  He does smoke and use methamphetamines.  He denies any allergies.  He reports a prior incident when he had to have drainage to something in his mouth, its unclear if this was a peritonsillar abscess or a dental abscess.    REVIEW OF SYSTEMS  Review of Systems   Constitutional: Negative for fever.   HENT: Positive for sore throat. Negative for congestion.    Respiratory: Negative for shortness of breath.    Cardiovascular: Negative for chest pain.   Gastrointestinal: Negative for abdominal pain and vomiting.   Neurological: Negative for headaches.       PAST MEDICAL HISTORY   has a past medical history of Seizure disorder (HCC).    SURGICAL HISTORY  patient denies any surgical history    SOCIAL HISTORY  Social History     Tobacco Use   • Smoking status: Current Every Day Smoker     Packs/day: 0.50   • Smokeless tobacco: Never Used   • Tobacco comment: 1/2 ppd   Vaping Use   • Vaping Use: Never used   Substance Use Topics   • Alcohol use: No   • Drug use: Yes     Types: Inhaled     Comment: meth      Social History     Substance and Sexual Activity   Drug Use Yes   • Types: Inhaled    Comment: meth       FAMILY  HISTORY  History reviewed. No pertinent family history.    CURRENT MEDICATIONS  Home Medications     Reviewed by Brenda Ross (Pharmacy Tech) on 04/13/22 at 1349  Med List Status: Complete   Medication Last Dose Status   Sertraline HCl (ZOLOFT PO) 1MONTH Active                ALLERGIES  No Known Allergies    PHYSICAL EXAM  VITAL SIGNS: /87   Pulse (!) 111   Temp 36.7 °C (98 °F) (Temporal)   Resp 18   Ht 1.829 m (6')   Wt 71.3 kg (157 lb 3 oz)   SpO2 96%   BMI 21.32 kg/m²   Vitals reviewed.  Constitutional: Patient is oriented to person, place, and time. Appears well-developed and well-nourished. moderate distress.    Head: Normocephalic and atraumatic.  Mouth/Throat: Oropharynx is clear and moist, no exudates. No evidence of peritonsillar abscess.  There is mild swelling to the floor of the mouth.  Patient is managing his own secretions.  The tongue is not swollen.  There is significant swelling to the right lower face, pain and swelling to the right buccal surface and to the gums adjacent to the teeth on the right lower aspect.  Eyes: Conjunctivae are normal. Pupils are equal and round.  Neck: Normal range of motion. Neck supple.  Cardiovascular: Normal rate, regular rhythm and normal heart sounds.   Pulmonary/Chest: Effort normal and breath sounds normal. No respiratory distress, no wheezes, rhonchi, or rales.   Abdominal: Soft. Bowel sounds are normal. There is no tenderness.  Musculoskeletal: No edema   Lymphadenopathy: Right cervical and submandibular adenopathy.   Neurological: No focal deficits.   Skin: Skin is warm and dry. No erythema. No pallor.   Psychiatric: Patient has a normal mood and affect.     LABS  Results for orders placed or performed during the hospital encounter of 04/13/22   CBC WITH DIFFERENTIAL   Result Value Ref Range    WBC 7.7 4.8 - 10.8 K/uL    RBC 5.45 4.70 - 6.10 M/uL    Hemoglobin 16.1 14.0 - 18.0 g/dL    Hematocrit 47.0 42.0 - 52.0 %    MCV 86.2 81.4 - 97.8 fL     MCH 29.5 27.0 - 33.0 pg    MCHC 34.3 33.7 - 35.3 g/dL    RDW 41.9 35.9 - 50.0 fL    Platelet Count 107 (L) 164 - 446 K/uL    MPV 10.8 9.0 - 12.9 fL    Neutrophils-Polys 64.00 44.00 - 72.00 %    Lymphocytes 21.10 (L) 22.00 - 41.00 %    Monocytes 14.00 (H) 0.00 - 13.40 %    Eosinophils 0.30 0.00 - 6.90 %    Basophils 0.30 0.00 - 1.80 %    Immature Granulocytes 0.30 0.00 - 0.90 %    Nucleated RBC 0.00 /100 WBC    Neutrophils (Absolute) 4.95 1.82 - 7.42 K/uL    Lymphs (Absolute) 1.63 1.00 - 4.80 K/uL    Monos (Absolute) 1.08 (H) 0.00 - 0.85 K/uL    Eos (Absolute) 0.02 0.00 - 0.51 K/uL    Baso (Absolute) 0.02 0.00 - 0.12 K/uL    Immature Granulocytes (abs) 0.02 0.00 - 0.11 K/uL    NRBC (Absolute) 0.00 K/uL   Basic Metabolic Panel   Result Value Ref Range    Sodium 133 (L) 135 - 145 mmol/L    Potassium 4.2 3.6 - 5.5 mmol/L    Chloride 101 96 - 112 mmol/L    Co2 21 20 - 33 mmol/L    Glucose 104 (H) 65 - 99 mg/dL    Bun 17 8 - 22 mg/dL    Creatinine 0.42 (L) 0.50 - 1.40 mg/dL    Calcium 9.1 8.5 - 10.5 mg/dL    Anion Gap 11.0 7.0 - 16.0   SARS-COV Antigen MONSERRAT   Result Value Ref Range    SARS-CoV-2 Source Nasal Swab     SARS-COV ANTIGEN MONSERRAT NotDetected NotDetected   ESTIMATED GFR   Result Value Ref Range    GFR (CKD-EPI) 126 >60 mL/min/1.73 m 2       All labs reviewed by me.    RADIOLOGY  CT-MAXILLOFACIAL WITH PLUS RECONS   Final Result      1.  Dental disease with multiple cavities. Periapical lucency surrounding right first mandibular molar consistent with endodontal disease.   2.  Right facial cellulitis. No drainable abscess is visualized.      [id number]   Final Result      1.  No acute bony injury   2.  No abnormal periapical lucencies        The radiologist's interpretation of all radiological studies have been reviewed by me.    COURSE & MEDICAL DECISION MAKING  Pertinent Labs & Imaging studies reviewed. (See chart for details)    Obtained and reviewed past medical records.  Patient's last encounter was in  2017 for epigastric pain and vomiting.  Prior to that patient was seen in 2016, December for flulike symptoms.  In 2011 he was seen for oral swelling of the right side of his face.  He was diagnosed with a premolar tooth infection and started on antibiotics and pain medication.    10:21 AM - Patient seen and examined at bedside.  Patient has significant right facial swelling I am concerned about swelling to the floor of the mouth though he does not demonstrate any clinical signs of Heriberto's angina at this time.  He is tachycardic, he is afebrile.  My suspicion, is that this patient is going to need operative intervention.      1120AM oral Surgery paged.     12:21 PM patient's reevaluated at the bedside.  He is informed, I am awaiting return call from oral surgery who is currently in a procedure.  He understands I will be kept n.p.o.  An IV has been established.  Await labs.  He will be treated with pain medication.    12:43PM D/W oral surgeon, Dr. Estes, who advises further evaluation with Panorex and CT of the area.  Await labs.  Patient will be kept n.p.o. as he has been up to this point.  Will start IV fluids will be treated with pain medication.  Of ordered Covid swabbing.  Plan for operative intervention later today and admission to the hospitalist service.    I did speak with Dr. Howard, Hospitalist, who agrees to admit the patient to their service.  She knows, that plan is for oral surgical intervention later today.  Await Panorex, lab results and CT max face.    2:31 PM patient CT reviewed.  Extensive facial cellulitis but no fluid collection and evidence of endodontal disease.  As previously noted, I do think this patient will need operative intervention which is planned for later today.  He did he is updated on this plan of care.  He is resting. He appears slightly more swollen and is having more pain.  He does have pain medication written by the admitting service nursing staff is made aware and will  treat appropriately.  Patient understands he still cannot have anything by mouth.    FINAL IMPRESSION  1. Facial cellulitis    2. Dental infection    3. Endodontic disease

## 2022-04-13 NOTE — ED NOTES
Pharmacy Medication Reconciliation      ~Medication reconciliation updated and complete per patient at bedside  ~Allergies have been verified   ~No oral ABX within the last 30 days  ~Patient home pharmacy:[facility]    ~Pharmacy unable to very strength with patient home pharmacy

## 2022-04-13 NOTE — ASSESSMENT & PLAN NOTE
C/o swelling over right lower jaw   Mandible X ray  ERP discussed with Dental Surgeon.  Patient was initiated on Unasyn and will be taken to the OR later today.  NPO   IVF   Pain control

## 2022-04-13 NOTE — H&P
Hospital Medicine History & Physical Note    Date of Service  4/13/2022    Primary Care Physician  Pcp Pt States None    Consultants  Dental Surgery    Specialist Names: Dr Estes     Code Status  Full Code    Chief Complaint  Chief Complaint   Patient presents with   • Facial Swelling     Pt reports awakening this morning to a large right lower jaw abscess. Pt denies tooth pain or trauma.        History of Presenting Illness  Reji Diana is a 57 y.o. male with medical history of tobacco use who presented 4/13/2022 with facial swelling and jaw pain. Pt denied any recent facial injury or dental procedure done recently. C/o dental pain on right lower jaw x 2 days, but no swelling was noted at the time.  This morning he woke up with worsening pain and noticeable swelling over right side of his face and jaw. Pt denied fever, chills but difficulty with swallowing and chewing.  Patient denies IVDU or alcohol use, but he does smoke and Meth.  At ER, vitals-tachycardia  labs showed hyponatremia.   No leucocytosis.   CT Maxillofacial - Dental disease with multiple cavities. Periapical lucency surrounding right first mandibular molar consistent with endodontal disease.Right facial cellulitis. No drainable abscess.   ERP discussed with Dental Surgeon.  Patient was initiated on Unasyn. Plan for OR later today.     I discussed the plan of care with patient.    Review of Systems  Review of Systems   Constitutional: Negative for chills, fever and malaise/fatigue.   HENT: Negative for congestion, ear discharge, ear pain, sinus pain and sore throat.         Jaw Pain   Facial Swelling    Eyes: Negative for blurred vision and double vision.   Respiratory: Negative for cough, sputum production, shortness of breath and wheezing.    Cardiovascular: Negative for chest pain, palpitations and leg swelling.   Gastrointestinal: Negative for abdominal pain, constipation, diarrhea, nausea and vomiting.   Genitourinary: Negative for  dysuria, frequency and urgency.   Musculoskeletal: Negative for myalgias.   Neurological: Negative for dizziness, focal weakness and headaches.   Psychiatric/Behavioral: The patient is not nervous/anxious.        Past Medical History   has a past medical history of Seizure disorder (HCC).    Surgical History   has no past surgical history on file.     Family History  family history is not on file.   Family history reviewed with patient. There is no family history that is pertinent to the chief complaint.     Social History   reports that he has been smoking. He has been smoking about 0.50 packs per day. He has never used smokeless tobacco. He reports current drug use. Drug: Inhaled. He reports that he does not drink alcohol.    Allergies  No Known Allergies    Medications  Prior to Admission Medications   Prescriptions Last Dose Informant Patient Reported? Taking?   azithromycin (ZITHROMAX) 250 MG Tab   No No   Si po q day for 4 days   benzonatate (TESSALON) 200 MG capsule   No No   Sig: Take 1 Cap by mouth 3 times a day as needed for Cough.   clindamycin (CLEOCIN) 150 MG CAPS   No No   Sig: Take 1 Cap by mouth 4 times a day.   hydrocodone-acetaminophen (NORCO) 5-325 MG Tab per tablet   No No   Sig: Take 1-2 Tabs by mouth every 6 hours as needed.   hydrocodone-acetaminophen (NORCO) 5-325 MG Tab per tablet   No No   Sig: Take 1-2 Tabs by mouth every 6 hours as needed.   hydrocodone-acetaminophen (VICODIN ES) 7.5-750 MG per tablet   No No   Sig: Take 1 Tab by mouth every four hours as needed for Mild Pain (pain).      Facility-Administered Medications: None       Physical Exam  Temp:  [36.7 °C (98 °F)] 36.7 °C (98 °F)  Pulse:  [111] 111  Resp:  [18] 18  BP: (134)/(87) 134/87  SpO2:  [96 %] 96 %  Blood Pressure: 134/87   Temperature: 36.7 °C (98 °F)   Pulse: (!) 111   Respiration: 18   Pulse Oximetry: 96 %       Physical Exam  Constitutional:       General: He is not in acute distress.  HENT:      Head:  Normocephalic and atraumatic.      Nose: Nose normal.      Mouth/Throat:      Mouth: Mucous membranes are moist.      Pharynx: No posterior oropharyngeal erythema.      Comments: Dental Pain   Facial Swelling   Eyes:      General: No scleral icterus.     Extraocular Movements: Extraocular movements intact.      Conjunctiva/sclera: Conjunctivae normal.      Pupils: Pupils are equal, round, and reactive to light.   Cardiovascular:      Rate and Rhythm: Normal rate and regular rhythm.      Pulses: Normal pulses.      Heart sounds: Normal heart sounds. No murmur heard.    No gallop.   Pulmonary:      Effort: Pulmonary effort is normal.      Breath sounds: Normal breath sounds. No stridor. No wheezing, rhonchi or rales.   Abdominal:      General: Bowel sounds are normal.      Palpations: Abdomen is soft.   Musculoskeletal:         General: No swelling or tenderness.      Cervical back: Normal range of motion and neck supple. No rigidity.   Skin:     General: Skin is warm.   Neurological:      General: No focal deficit present.      Mental Status: He is alert and oriented to person, place, and time.   Psychiatric:         Mood and Affect: Mood normal.         Behavior: Behavior normal.         Laboratory:  Recent Labs     04/13/22  1249   WBC 7.7   RBC 5.45   HEMOGLOBIN 16.1   HEMATOCRIT 47.0   MCV 86.2   MCH 29.5   MCHC 34.3   RDW 41.9   PLATELETCT 107*   MPV 10.8     Recent Labs     04/13/22  1249   SODIUM 133*   POTASSIUM 4.2   CHLORIDE 101   CO2 21   GLUCOSE 104*   BUN 17   CREATININE 0.42*   CALCIUM 9.1     Recent Labs     04/13/22  1249   GLUCOSE 104*         No results for input(s): NTPROBNP in the last 72 hours.      No results for input(s): TROPONINT in the last 72 hours.    Imaging:  [id number]    (Results Pending)   CT-MAXILLOFACIAL WITH PLUS RECONS    (Results Pending)       mandible X ray     Assessment/Plan:  I anticipate this patient will require at least two midnights for appropriate medical  management, necessitating inpatient admission.    * Dental abscess- (present on admission)  Assessment & Plan  C/o swelling over right lower jaw   Mandible X ray  ERP discussed with Dental Surgeon.  Patient was initiated on Unasyn and will be taken to the OR later today.  NPO   IVF   Pain control     Tobacco use  Assessment & Plan  Counseling done   Nicotine replacement     Hyponatremia  Assessment & Plan  IVF   Will monitor   Na in AM      VTE prophylaxis: SCDs/TEDs and pharmacologic prophylaxis contraindicated due to OR later today

## 2022-04-14 VITALS
DIASTOLIC BLOOD PRESSURE: 66 MMHG | WEIGHT: 157.19 LBS | OXYGEN SATURATION: 96 % | BODY MASS INDEX: 21.29 KG/M2 | TEMPERATURE: 97.9 F | HEART RATE: 79 BPM | RESPIRATION RATE: 18 BRPM | SYSTOLIC BLOOD PRESSURE: 112 MMHG | HEIGHT: 72 IN

## 2022-04-14 PROBLEM — K04.7 DENTAL ABSCESS: Status: RESOLVED | Noted: 2022-04-13 | Resolved: 2022-04-14

## 2022-04-14 LAB
ALBUMIN SERPL BCP-MCNC: 3.3 G/DL (ref 3.2–4.9)
ALBUMIN/GLOB SERPL: 1 G/DL
ALP SERPL-CCNC: 99 U/L (ref 30–99)
ALT SERPL-CCNC: 104 U/L (ref 2–50)
ANION GAP SERPL CALC-SCNC: 9 MMOL/L (ref 7–16)
AST SERPL-CCNC: 91 U/L (ref 12–45)
BASOPHILS # BLD AUTO: 0 % (ref 0–1.8)
BASOPHILS # BLD: 0 K/UL (ref 0–0.12)
BILIRUB SERPL-MCNC: 1.5 MG/DL (ref 0.1–1.5)
BUN SERPL-MCNC: 20 MG/DL (ref 8–22)
CALCIUM SERPL-MCNC: 8.4 MG/DL (ref 8.5–10.5)
CHLORIDE SERPL-SCNC: 100 MMOL/L (ref 96–112)
CO2 SERPL-SCNC: 22 MMOL/L (ref 20–33)
CREAT SERPL-MCNC: 0.51 MG/DL (ref 0.5–1.4)
EOSINOPHIL # BLD AUTO: 0 K/UL (ref 0–0.51)
EOSINOPHIL NFR BLD: 0 % (ref 0–6.9)
ERYTHROCYTE [DISTWIDTH] IN BLOOD BY AUTOMATED COUNT: 44.1 FL (ref 35.9–50)
GFR SERPLBLD CREATININE-BSD FMLA CKD-EPI: 119 ML/MIN/1.73 M 2
GLOBULIN SER CALC-MCNC: 3.3 G/DL (ref 1.9–3.5)
GLUCOSE SERPL-MCNC: 135 MG/DL (ref 65–99)
HCT VFR BLD AUTO: 44.8 % (ref 42–52)
HGB BLD-MCNC: 14.7 G/DL (ref 14–18)
IMM GRANULOCYTES # BLD AUTO: 0.02 K/UL (ref 0–0.11)
IMM GRANULOCYTES NFR BLD AUTO: 0.3 % (ref 0–0.9)
LYMPHOCYTES # BLD AUTO: 0.9 K/UL (ref 1–4.8)
LYMPHOCYTES NFR BLD: 15.2 % (ref 22–41)
MCH RBC QN AUTO: 29.5 PG (ref 27–33)
MCHC RBC AUTO-ENTMCNC: 32.8 G/DL (ref 33.7–35.3)
MCV RBC AUTO: 90 FL (ref 81.4–97.8)
MONOCYTES # BLD AUTO: 0.13 K/UL (ref 0–0.85)
MONOCYTES NFR BLD AUTO: 2.2 % (ref 0–13.4)
NEUTROPHILS # BLD AUTO: 4.87 K/UL (ref 1.82–7.42)
NEUTROPHILS NFR BLD: 82.3 % (ref 44–72)
NRBC # BLD AUTO: 0 K/UL
NRBC BLD-RTO: 0 /100 WBC
PLATELET # BLD AUTO: 89 K/UL (ref 164–446)
PMV BLD AUTO: 12.1 FL (ref 9–12.9)
POTASSIUM SERPL-SCNC: 5 MMOL/L (ref 3.6–5.5)
PROT SERPL-MCNC: 6.6 G/DL (ref 6–8.2)
RBC # BLD AUTO: 4.98 M/UL (ref 4.7–6.1)
SODIUM SERPL-SCNC: 131 MMOL/L (ref 135–145)
WBC # BLD AUTO: 5.9 K/UL (ref 4.8–10.8)

## 2022-04-14 PROCEDURE — 85025 COMPLETE CBC W/AUTO DIFF WBC: CPT

## 2022-04-14 PROCEDURE — 700105 HCHG RX REV CODE 258: Performed by: STUDENT IN AN ORGANIZED HEALTH CARE EDUCATION/TRAINING PROGRAM

## 2022-04-14 PROCEDURE — 700111 HCHG RX REV CODE 636 W/ 250 OVERRIDE (IP): Performed by: STUDENT IN AN ORGANIZED HEALTH CARE EDUCATION/TRAINING PROGRAM

## 2022-04-14 PROCEDURE — 700102 HCHG RX REV CODE 250 W/ 637 OVERRIDE(OP): Performed by: STUDENT IN AN ORGANIZED HEALTH CARE EDUCATION/TRAINING PROGRAM

## 2022-04-14 PROCEDURE — 99239 HOSP IP/OBS DSCHRG MGMT >30: CPT | Performed by: NURSE PRACTITIONER

## 2022-04-14 PROCEDURE — A9270 NON-COVERED ITEM OR SERVICE: HCPCS | Performed by: STUDENT IN AN ORGANIZED HEALTH CARE EDUCATION/TRAINING PROGRAM

## 2022-04-14 PROCEDURE — 80053 COMPREHEN METABOLIC PANEL: CPT

## 2022-04-14 RX ORDER — AMOXICILLIN AND CLAVULANATE POTASSIUM 875; 125 MG/1; MG/1
1 TABLET, FILM COATED ORAL 2 TIMES DAILY
Qty: 14 TABLET | Refills: 0 | Status: SHIPPED | OUTPATIENT
Start: 2022-04-14 | End: 2022-04-14 | Stop reason: SDUPTHER

## 2022-04-14 RX ORDER — AMOXICILLIN 250 MG
2 CAPSULE ORAL 2 TIMES DAILY
Qty: 30 TABLET | Refills: 0 | Status: SHIPPED | OUTPATIENT
Start: 2022-04-14 | End: 2022-04-14

## 2022-04-14 RX ORDER — OXYCODONE HYDROCHLORIDE 5 MG/1
5 TABLET ORAL
Qty: 30 TABLET | Refills: 0 | Status: SHIPPED | OUTPATIENT
Start: 2022-04-14 | End: 2022-04-14 | Stop reason: SDUPTHER

## 2022-04-14 RX ORDER — ACETAMINOPHEN 325 MG/1
650 TABLET ORAL EVERY 6 HOURS PRN
Qty: 30 TABLET | Refills: 0 | Status: SHIPPED | OUTPATIENT
Start: 2022-04-14 | End: 2022-04-14

## 2022-04-14 RX ORDER — NICOTINE 21 MG/24HR
1 PATCH, TRANSDERMAL 24 HOURS TRANSDERMAL EVERY 24 HOURS
Qty: 30 PATCH | Refills: 0 | Status: SHIPPED | OUTPATIENT
Start: 2022-04-14

## 2022-04-14 RX ORDER — AMOXICILLIN AND CLAVULANATE POTASSIUM 875; 125 MG/1; MG/1
1 TABLET, FILM COATED ORAL 2 TIMES DAILY
Qty: 14 TABLET | Refills: 0 | Status: SHIPPED | OUTPATIENT
Start: 2022-04-14 | End: 2022-04-21

## 2022-04-14 RX ORDER — ACETAMINOPHEN 325 MG/1
650 TABLET ORAL EVERY 6 HOURS PRN
Qty: 30 TABLET | Refills: 0 | COMMUNITY
Start: 2022-04-14

## 2022-04-14 RX ORDER — OXYCODONE HYDROCHLORIDE 5 MG/1
5 TABLET ORAL EVERY 4 HOURS PRN
Qty: 28 TABLET | Refills: 0 | Status: SHIPPED | OUTPATIENT
Start: 2022-04-14 | End: 2022-04-19

## 2022-04-14 RX ORDER — AMOXICILLIN 250 MG
2 CAPSULE ORAL 2 TIMES DAILY
Qty: 30 TABLET | Refills: 0 | COMMUNITY
Start: 2022-04-14

## 2022-04-14 RX ADMIN — SODIUM CHLORIDE 3 G: 900 INJECTION INTRAVENOUS at 01:12

## 2022-04-14 RX ADMIN — NICOTINE 14 MG: 14 PATCH TRANSDERMAL at 05:21

## 2022-04-14 RX ADMIN — SODIUM CHLORIDE 3 G: 900 INJECTION INTRAVENOUS at 07:46

## 2022-04-14 RX ADMIN — SODIUM CHLORIDE 3 G: 900 INJECTION INTRAVENOUS at 12:52

## 2022-04-14 RX ADMIN — OXYCODONE 5 MG: 5 TABLET ORAL at 07:46

## 2022-04-14 ASSESSMENT — COGNITIVE AND FUNCTIONAL STATUS - GENERAL
MOVING FROM LYING ON BACK TO SITTING ON SIDE OF FLAT BED: A LITTLE
DRESSING REGULAR UPPER BODY CLOTHING: A LITTLE
DAILY ACTIVITIY SCORE: 21
SUGGESTED CMS G CODE MODIFIER DAILY ACTIVITY: CJ
SUGGESTED CMS G CODE MODIFIER MOBILITY: CJ
STANDING UP FROM CHAIR USING ARMS: A LITTLE
MOBILITY SCORE: 20
DRESSING REGULAR LOWER BODY CLOTHING: A LITTLE
CLIMB 3 TO 5 STEPS WITH RAILING: A LITTLE
HELP NEEDED FOR BATHING: A LITTLE
WALKING IN HOSPITAL ROOM: A LITTLE

## 2022-04-14 ASSESSMENT — PAIN DESCRIPTION - PAIN TYPE
TYPE: ACUTE PAIN

## 2022-04-14 ASSESSMENT — PATIENT HEALTH QUESTIONNAIRE - PHQ9
SUM OF ALL RESPONSES TO PHQ9 QUESTIONS 1 AND 2: 2
7. TROUBLE CONCENTRATING ON THINGS, SUCH AS READING THE NEWSPAPER OR WATCHING TELEVISION: NOT AT ALL
4. FEELING TIRED OR HAVING LITTLE ENERGY: NOT AT ALL
1. LITTLE INTEREST OR PLEASURE IN DOING THINGS: SEVERAL DAYS
6. FEELING BAD ABOUT YOURSELF - OR THAT YOU ARE A FAILURE OR HAVE LET YOURSELF OR YOUR FAMILY DOWN: NOT AL ALL
SUM OF ALL RESPONSES TO PHQ QUESTIONS 1-9: 3
9. THOUGHTS THAT YOU WOULD BE BETTER OFF DEAD, OR OF HURTING YOURSELF: NOT AT ALL
2. FEELING DOWN, DEPRESSED, IRRITABLE, OR HOPELESS: SEVERAL DAYS
5. POOR APPETITE OR OVEREATING: NOT AT ALL
8. MOVING OR SPEAKING SO SLOWLY THAT OTHER PEOPLE COULD HAVE NOTICED. OR THE OPPOSITE, BEING SO FIGETY OR RESTLESS THAT YOU HAVE BEEN MOVING AROUND A LOT MORE THAN USUAL: NOT AT ALL
3. TROUBLE FALLING OR STAYING ASLEEP OR SLEEPING TOO MUCH: SEVERAL DAYS

## 2022-04-14 ASSESSMENT — LIFESTYLE VARIABLES
TOTAL SCORE: 0
HAVE PEOPLE ANNOYED YOU BY CRITICIZING YOUR DRINKING: NO
HOW MANY TIMES IN THE PAST YEAR HAVE YOU HAD 5 OR MORE DRINKS IN A DAY: 0
CONSUMPTION TOTAL: NEGATIVE
ON A TYPICAL DAY WHEN YOU DRINK ALCOHOL HOW MANY DRINKS DO YOU HAVE: 0
EVER HAD A DRINK FIRST THING IN THE MORNING TO STEADY YOUR NERVES TO GET RID OF A HANGOVER: NO
TOTAL SCORE: 0
EVER FELT BAD OR GUILTY ABOUT YOUR DRINKING: NO
TOTAL SCORE: 0
AVERAGE NUMBER OF DAYS PER WEEK YOU HAVE A DRINK CONTAINING ALCOHOL: 0
ALCOHOL_USE: NO
HAVE YOU EVER FELT YOU SHOULD CUT DOWN ON YOUR DRINKING: NO

## 2022-04-14 NOTE — PROGRESS NOTES
Assumed care of patient from night shift RN.  Patient is alert and oriented times 4, states pain of 7/10, medicated per MAR.  VSS /66   Pulse 79   Temp 36.6 °C (97.9 °F) (Temporal)   Resp 18   Ht 1.829 m (6')   Wt 71.3 kg (157 lb 3 oz)   SpO2 96%   BMI 21.32 kg/m²   PIV in the RFA, patent and running NS at 100mL/hr.  On RA with saturations in the mid 90s.  Last BM PTA, urinating without difficulty.  Advanced to a clear liquid diet this AM.  Will advance to fulls for lunch if tolerating.  Gauze to the mouth from surgery.  Patient is a SBA, demonstrates steady gait, minimal assistance needed.  POC discussed for the day, bed is locked and in the lowest position, call light is within reach.  All needs are met at this time, hourly rounding is in place.

## 2022-04-14 NOTE — CARE PLAN
The patient is Stable - Low risk of patient condition declining or worsening    Shift Goals  Clinical Goals: monitor drainage from procedure site  Patient Goals: rest    Progress made toward(s) clinical / shift goals:  Pt has not had drainage from procedural site. Gauze to be changed PRN saturation. Pt has been resting comfortably during the shift.      Problem: Fall Risk  Goal: Patient will remain free from falls  4/14/2022 0147 by Olivia Deras, R.N.  Outcome: Progressing  4/14/2022 0147 by Olivia Deras, R.N.  Outcome: Progressing     Problem: Knowledge Deficit - Standard  Goal: Patient and family/care givers will demonstrate understanding of plan of care, disease process/condition, diagnostic tests and medications  4/14/2022 0147 by Olivia Deras, R.N.  Outcome: Progressing  4/14/2022 0147 by Olivia Deras, R.N.  Outcome: Progressing     Problem: Pain - Standard  Goal: Alleviation of pain or a reduction in pain to the patient’s comfort goal  Outcome: Progressing       Patient is not progressing towards the following goals:

## 2022-04-14 NOTE — OR NURSING
Awake, alert and tolerating PO fluids.  Pt declines pain.  Vitals stable.  On monitors with alarms audible.

## 2022-04-14 NOTE — ANESTHESIA PROCEDURE NOTES
Airway    Date/Time: 4/13/2022 5:36 PM  Performed by: Iban Manzanares M.D.  Authorized by: Iban Manzanares M.D.     Location:  OR  Urgency:  Elective  Difficult Airway: No    Indications for Airway Management:  Anesthesia      Spontaneous Ventilation: absent    Sedation Level:  Deep  Preoxygenated: Yes    Patient Position:  Sniffing  Final Airway Type:  Endotracheal airway  Final Endotracheal Airway:  ETT  Cuffed: Yes    Technique Used for Successful ETT Placement:  Direct laryngoscopy    Insertion Site:  Oral  Blade Type:  Kenrick  Laryngoscope Blade/Videolaryngoscope Blade Size:  3  ETT Size (mm):  8.0  Measured from:  Lips  ETT to Lips (cm):  23  Placement Verified by: auscultation and capnometry    Cormack-Lehane Classification:  Grade IIa - partial view of glottis  Number of Attempts at Approach:  1  Number of Other Approaches Attempted:  0

## 2022-04-14 NOTE — DISCHARGE INSTRUCTIONS
Discharge Instructions    Discharged to home by car with relative. Discharged via wheelchair, hospital escort: Yes.  Special equipment needed: Not Applicable    Be sure to schedule a follow-up appointment with your primary care doctor or any specialists as instructed.     Discharge Plan:   Diet Plan: Discussed  Activity Level: Discussed  Confirmed Follow up Appointment: Appointment Scheduled  Confirmed Symptoms Management: Discussed  Medication Reconciliation Updated: Yes    I understand that a diet low in cholesterol, fat, and sodium is recommended for good health. Unless I have been given specific instructions below for another diet, I accept this instruction as my diet prescription.   Other diet:     Special Instructions: None    · Is patient discharged on Warfarin / Coumadin?   No     Depression / Suicide Risk    As you are discharged from this [facility] facility, it is important to learn how to keep safe from harming yourself.    Recognize the warning signs:  · Abrupt changes in personality, positive or negative- including increase in energy   · Giving away possessions  · Change in eating patterns- significant weight changes-  positive or negative  · Change in sleeping patterns- unable to sleep or sleeping all the time   · Unwillingness or inability to communicate  · Depression  · Unusual sadness, discouragement and loneliness  · Talk of wanting to die  · Neglect of personal appearance   · Rebelliousness- reckless behavior  · Withdrawal from people/activities they love  · Confusion- inability to concentrate     If you or a loved one observes any of these behaviors or has concerns about self-harm, here's what you can do:  · Talk about it- your feelings and reasons for harming yourself  · Remove any means that you might use to hurt yourself (examples: pills, rope, extension cords, firearm)  · Get professional help from the community (Mental Health, Substance Abuse, psychological counseling)  · Do not be  alone:Call your Safe Contact- someone whom you trust who will be there for you.  · Call your local CRISIS HOTLINE 457-8113 or 275-223-5695  · Call your local Children's Mobile Crisis Response Team [place] (377) 461-4816 or www.[employer]  · Call the toll free National Suicide Prevention Hotlines   · National Suicide Prevention Lifeline 483-705-BANG (0038)  · National Hope Line Network 800-SUICIDE (780-3528)    Oral saline rinses 3 times daily after meals.  Complete entire course of antibiotics  Cover surgical site with gauze as needed to absorb drainage  Soft diet for 1 week.  Return to Emergency department for worsening pain or new symptoms.  JERALD Hoyos     Discharge Instructions    Discharged to home by car with friend. Discharged via wheelchair, hospital escort: Yes.  Special equipment needed: Not Applicable    Be sure to schedule a follow-up appointment with your primary care doctor or any specialists as instructed.     Discharge Plan:        I understand that a diet low in cholesterol, fat, and sodium is recommended for good health. Unless I have been given specific instructions below for another diet, I accept this instruction as my diet prescription.   Other diet: soft diet    Special Instructions: None    · Is patient discharged on Warfarin / Coumadin?   No     Depression / Suicide Risk    As you are discharged from this [facility], it is important to learn how to keep safe from harming yourself.    Recognize the warning signs:  · Abrupt changes in personality, positive or negative- including increase in energy   · Giving away possessions  · Change in eating patterns- significant weight changes-  positive or negative  · Change in sleeping patterns- unable to sleep or sleeping all the time   · Unwillingness or inability to communicate  · Depression  · Unusual sadness, discouragement and loneliness  · Talk of wanting to die  · Neglect of personal  appearance   · Rebelliousness- reckless behavior  · Withdrawal from people/activities they love  · Confusion- inability to concentrate     If you or a loved one observes any of these behaviors or has concerns about self-harm, here's what you can do:  · Talk about it- your feelings and reasons for harming yourself  · Remove any means that you might use to hurt yourself (examples: pills, rope, extension cords, firearm)  · Get professional help from the community (Mental Health, Substance Abuse, psychological counseling)  · Do not be alone:Call your Safe Contact- someone whom you trust who will be there for you.  · Call your local CRISIS HOTLINE 881-5686 or 247-757-2546  · Call your local Children's Mobile Crisis Response Team [place] (771) 758-2606 or www.[employer]  · Call the toll free National Suicide Prevention Hotlines   · National Suicide Prevention Lifeline 252-796-XZPH (7462)  · [employer] Line Network 800-SUICIDE (699-8880)      Dental Abscess    A dental abscess is an area of pus in or around a tooth. It comes from an infection. It can cause pain and other symptoms. Treatment will help with symptoms and prevent the infection from spreading.  Follow these instructions at home:  Medicines  · Take over-the-counter and prescription medicines only as told by your dentist.  · If you were prescribed an antibiotic medicine, take it as told by your dentist. Do not stop taking it even if you start to feel better.  · If you were prescribed a gel that has numbing medicine in it, use it exactly as told.  · Do not drive or use heavy machinery (like a ) while taking prescription pain medicine.  General instructions  · Rinse out your mouth often with salt water.  ? To make salt water, dissolve ½-1 tsp of salt in 1 cup of warm water.  · Eat a soft diet while your mouth is healing.  · Drink enough fluid to keep your urine pale yellow.  · Do not apply heat to the outside of your mouth.  · Do not use  any products that contain nicotine or tobacco. These include cigarettes and e-cigarettes. If you need help quitting, ask your doctor.  · Keep all follow-up visits as told by your dentist. This is important.  Prevent an abscess  · Brush your teeth every morning and every night. Use fluoride toothpaste.  · Floss your teeth each day.  · Get dental cleanings as often as told by your dentist.  · Think about getting dental sealant put on teeth that have deep holes (decay).  · Drink water that has fluoride in it.  ? Most tap water has fluoride.  ? Check the label on bottled water to see if it has fluoride in it.  · Drink water instead of sugary drinks.  · Eat healthy meals and snacks.  · Wear a mouth guard or face shield when you play sports.  Contact a doctor if:  · Your pain is worse, and medicine does not help.  Get help right away if:  · You have a fever or chills.  · Your symptoms suddenly get worse.  · You have a very bad headache.  · You have problems breathing or swallowing.  · You have trouble opening your mouth.  · You have swelling in your neck or close to your eye.  Summary  · A dental abscess is an area of pus in or around a tooth. It is caused by an infection.  · Treatment will help with symptoms and prevent the infection from spreading.  · Take over-the-counter and prescription medicines only as told by your dentist.  · To prevent an abscess, take good care of your teeth. Brush your teeth every morning and night. Use floss every day.  · Get dental cleanings as often as told by your dentist.  This information is not intended to replace advice given to you by your health care provider. Make sure you discuss any questions you have with your health care provider.  Document Released: 05/03/2016 Document Revised: 04/08/2020 Document Reviewed: 08/20/2018  Elsevier Patient Education © 2020 Elsevier Inc.      Dental Extraction, Care After  These instructions give you information about caring for yourself after your  procedure. Your doctor may also give you more specific instructions. Call your doctor if you have any problems or questions after your procedure.  Follow these instructions at home:  Mouth care  · Follow instructions from your doctor about how to take care of the area where your tooth was taken out. Make sure you:  ? Wash your hands with soap and water before you touch your mouth or your gauze pads. If you do not have soap and water, use hand .  ? Change your gauze and take it out as told by your doctor.  ? Leave stitches (sutures) in place. They may need to stay in place for 2 weeks or longer, or they may dissolve on their own over several weeks.  · If you have bleeding that does not stop, fold a clean piece of gauze and place it on the bleeding gum. Bite down on it gently but firmly. Do not chew on the gauze.  · Do not do any of these things until your doctor says it is okay:  ? Rinse your mouth.  ? Brush or floss near the area where your tooth was taken out. You may brush your other teeth.  ? Spit.  · After your doctor says that you may rinse your mouth:  ? Gargle with a salt-water mixture 3-4 times a day or as needed. To make a salt-water mixture, completely dissolve ½-1 tsp of salt in 1 cup of warm water.  ? Rinse very gently. Do not rinse with a lot of force, because doing that can affect how your mouth heals.  · If you wear fake teeth (dental prostheses), talk with your doctor about when you may start to wear them again.  Eating and drinking    · Do not drink through a straw until your doctor says it is okay.  · Eat foods that are cool and have a soft texture, as told by your doctor. Some examples are ice cream and yogurt.  · Avoid hot drinks and spicy foods until your mouth has healed.  Activity  · Do not drive or use heavy machinery for 24 hours if you were given a medicine to help you relax (sedative) during your procedure.  · Do not drive or use heavy machinery while taking prescription pain  medicine.  · Return to your normal activities as told by your doctor. Ask your doctor what activities are safe for you.  Managing pain and swelling  · If told, put ice on your cheek on the side of your mouth where the tooth was taken out:  ? Put ice in a plastic bag.  ? Place a towel between your skin and the bag.  ? Leave the ice on for 20 minutes, 2-3 times a day.  General instructions  · Take over-the-counter and prescription medicines only as told by your doctor.  · If you are taking prescription pain medicine, take actions to prevent or treat constipation. Your doctor may recommend that you:  ? Drink enough fluid to keep your pee (urine) pale yellow.  ? Limit foods that are high in fat and processed sugars, such as fried or sweet foods.  ? Take an over-the-counter or prescription medicine for constipation.  · If you were prescribed an antibiotic medicine, take it as told by your doctor. Do not stop taking the antibiotic even if you start to feel better.  · Do not use any products that contain nicotine or tobacco. These include cigarettes and e-cigarettes. If you need help quitting, ask your doctor.  · Keep all follow-up visits as told by your doctor. This is important.  Contact a doctor if:  · You have pain that does not get better after you take your medicine.  · You have any of the following:  ? A fever.  ? Feeling sick to your stomach (nausea).  ? Throwing up (vomiting).  ? Chills.  · You have any of these in or near the place where your tooth used to be (socket):  ? A lot of redness on your face.  ? A lot of swelling in your mouth or on your face.  ? A small amount of clear fluid or pus.  ? New bleeding.  · Your symptoms get worse.  · You get new symptoms.  · You lose feeling (numbness) in your lip or jaw and do not get it back.  · You have tingling in your lip or jaw that does not go away.  Get help right away if:  · You have very bad bleeding.  · You have bleeding that does not stop after you bite down on  many gauze pads.  · You have very bad pain that does not get better with medicine.  · You have swelling that gets worse instead of better.  · You have a lot of clear fluid or pus coming from where your tooth was taken out.  · You have trouble swallowing.  · You cannot open your mouth.  · You have shortness of breath.  · You have chest pain.  Summary  · If you have bleeding that does not stop, fold a clean piece of gauze and place it on the bleeding gum. Bite on the gauze gently but firmly.  · Do not rinse your mouth or spit until your doctor says that it is okay.  · Avoid hot drinks and spicy foods until your mouth heals.  This information is not intended to replace advice given to you by your health care provider. Make sure you discuss any questions you have with your health care provider.  Document Released: 06/07/2011 Document Revised: 11/30/2018 Document Reviewed: 10/04/2018  [employer] Patient Education © 2020 Elsevier Inc.

## 2022-04-14 NOTE — ANESTHESIA PREPROCEDURE EVALUATION
Case: [id number] Date/Time: 04/13/22 1716    Procedure: INCISION AND DRAINAGE, ABSCESS, SUBMANDIBULAR REGION TOOTH (Right )    Anesthesia type: General    Location: [facility] OR 10 / SURGERY [facility]    Surgeons: Nohelia Estes D.D.S.          Relevant Problems   No relevant active problems       Physical Exam    Airway   Mallampati: II  TM distance: >3 FB  Neck ROM: full       Cardiovascular - normal exam  Rhythm: regular  Rate: normal  (-) murmur     Dental - normal exam           Pulmonary - normal exam  Breath sounds clear to auscultation     Abdominal    Neurological - normal exam                 Anesthesia Plan    ASA 2       Plan - general       Airway plan will be ETT          Induction: intravenous    Postoperative Plan: Postoperative administration of opioids is intended.    Pertinent diagnostic labs and testing reviewed    Informed Consent:    Anesthetic plan and risks discussed with patient.    Use of blood products discussed with: patient whom consented to blood products.

## 2022-04-14 NOTE — PROGRESS NOTES
Report received from PACU RN. Pt transported in [facility] with all belongings accounted for.  Assessment complete.  A&O x 4. Patient calls appropriately.  Patient ambulates with standby assist. Bed alarm off.   Patient has 0/10 pain.  Denies N&V. Pt is strict NPO.  Gauze to inside of R mouth.  - void, + flatus, - BM.  Patient denies SOB.  SCD's on.  Review plan with of care with patient. Call light and personal belongings within reach. Hourly rounding in place. All needs met at this time.

## 2022-04-14 NOTE — PROGRESS NOTES
..4 Eyes Skin Assessment Completed by Olivia RN and DEONTE Hahn.    Head WDL  Ears WDL  Nose WDL  Mouth gauze covering I&D site  Neck WDL  Breast/Chest WDL  Shoulder Blades scars  Spine WDL  (R) Arm/Elbow/Hand WDL PIV  (L) Arm/Elbow/Hand WDL  Abdomen WDL  Groin WDL  Scrotum/Coccyx/Buttocks WDL  (R) Leg Scar and Scab  (L) Leg Scar and Scab  (R) Heel/Foot/Toe WDL  (L) Heel/Foot/Toe WDL          Devices In Places Pulse Ox and Nasal Cannula      Interventions In Place Pillows and Pressure Redistribution Mattress    Possible Skin Injury No    Pictures Uploaded Into Epic N/A  Wound Consult Placed N/A  RN Wound Prevention Protocol Ordered No

## 2022-04-14 NOTE — CARE PLAN
Problem: Fall Risk  Goal: Patient will remain free from falls  Outcome: Progressing     Problem: Knowledge Deficit - Standard  Goal: Patient and family/care givers will demonstrate understanding of plan of care, disease process/condition, diagnostic tests and medications  Outcome: Progressing     Problem: Pain - Standard  Goal: Alleviation of pain or a reduction in pain to the patient’s comfort goal  Outcome: Progressing   The patient is Stable - Low risk of patient condition declining or worsening    Shift Goals  Clinical Goals: pain control, discharge home  Patient Goals: Pain control  Family Goals: No family present    Progress made toward(s) clinical / shift goals:  pain control, discharge home    Patient is not progressing towards the following goals:

## 2022-04-14 NOTE — PROGRESS NOTES
1500 arrive to dc lounge via wheelchair. A/O, no complaints. DC instructions reviewed w/ pt, verbalized understanding.    1525 DC home w/ relative in stable condition.

## 2022-04-14 NOTE — ANESTHESIA POSTPROCEDURE EVALUATION
Patient: Reji Diana    Procedure Summary     Date: 04/13/22 Room / Location: [place] [address] / SURGERY [facility]    Anesthesia Start: 1730 Anesthesia Stop: 1828    Procedure: INCISION AND DRAINAGE, ABSCESS, SUBMANDIBULAR REGION TOOTH EXTRACTION (Right ) Diagnosis: (DENTAL ABSCESS)    Surgeons: Nohelia Estes D.D.S. Responsible Provider: Iban Manzanares M.D.    Anesthesia Type: general ASA Status: 2          Final Anesthesia Type: general  Last vitals  BP   Blood Pressure: 124/68    Temp   37.2 °C (98.9 °F)    Pulse   100   Resp   16    SpO2   95 %      Anesthesia Post Evaluation    Patient location during evaluation: PACU  Patient participation: complete - patient participated  Level of consciousness: awake and alert  Pain score: 3    Airway patency: patent  Anesthetic complications: no  Cardiovascular status: hemodynamically stable  Respiratory status: acceptable  Hydration status: euvolemic    PONV: none          No complications documented.     Nurse Pain Score: 0 (NPRS)

## 2022-04-14 NOTE — DISCHARGE SUMMARY
"Discharge Summary    CHIEF COMPLAINT ON ADMISSION  Chief Complaint   Patient presents with   • Facial Swelling     Pt reports awakening this morning to a large right lower jaw abscess. Pt denies tooth pain or trauma.        Reason for Admission  Facial swelling     Admission Date  4/13/2022    CODE STATUS  Full Code    HPI & HOSPITAL COURSE  Reji Diana is a 57 y.o. male with medical history of tobacco use who presented 4/13/2022 with facial swelling and jaw pain. Pt denied any recent facial injury or dental procedure done recently.  He complained of dental pain on right lower jaw for 2 days, but no swelling was noted at the time.    On the morning of admission he woke up with worsening pain and noticeable swelling over right side of his face and jaw. He denied fever, chills but difficulty with swallowing and chewing.  Patient denies IVDU or alcohol use, but he does smoke and take Meth.  At ER, vitals-tachycardia  labs showed hyponatremia.   No leukocytosis.   CT Maxillofacial - Dental disease with multiple cavities. Periapical lucency surrounding right first mandibular molar consistent with endodontal disease. Right facial cellulitis. No drainable abscess.   ERP discussed with Dental Surgeon.  Patient was initiated on Unasyn.   On the evening of admission he underwent incision and drainage of the right mandibular vestibule abscess and extraction of teeth numbers 30 and 32 with Dr. Estes. There were no complications and the patient tolerated well.  This morning the patient is upright in bed, fully alert and oriented.  He is pleasant and cooperative.  He says his pain is \"fine.\"  He denies any new symptoms.  He is tolerating a full liquid diet.  He had good urine output and a normal bowel movement overnight.  Okay per oral surgery for patient to discharge home with follow-up as needed.    Therefore, he is discharged in good and stable condition to home with close outpatient follow-up.    The patient recovered " much more quickly than anticipated on admission.    Discharge Date  04/14/22    FOLLOW UP ITEMS POST DISCHARGE  Bite on gauze as needed for bleeding  Oral saline rinses 3 times daily after meals  Complete entire course of antibiotics  Soft diet for 1 week  Follow-up with oral surgery as below as needed    DISCHARGE DIAGNOSES  Principal Problem (Resolved):    Dental abscess POA: Yes  Active Problems:    Hyponatremia POA: Yes    Tobacco use POA: Yes      FOLLOW UP  No future appointments.  Establish with primary care    Schedule an appointment as soon as possible for a visit in 1 week      [facility]  [address] [facility] [address]  [phone]        [facility]  [address] [facility] [address]  [phone]        Nohelia Estes D.D.S.  [address] [place] [address]  [phone]      As needed      MEDICATIONS ON DISCHARGE     Medication List      START taking these medications      Instructions   acetaminophen 325 MG Tabs  Commonly known as: Tylenol   Take 2 Tablets by mouth every 6 hours as needed.  Dose: 650 mg     amoxicillin-clavulanate 875-125 MG Tabs  Commonly known as: AUGMENTIN   Take 1 Tablet by mouth 2 times a day for 7 days.  Dose: 1 Tablet     nicotine 14 MG/24HR Pt24  Commonly known as: NICODERM   Place 1 Patch on the skin every 24 hours.  Dose: 1 Patch     nicotine polacrilex 2 MG Gum  Commonly known as: NICORETTE   Take 1 Each by mouth every 1 hour as needed for Smoking Cessation (For nicotine urge).  Dose: 2 mg     oxyCODONE immediate-release 5 MG Tabs  Commonly known as: ROXICODONE   Take 1 Tablet by mouth every 3 hours as needed for Severe Pain (ok to take with tylenol) for up to 5 days.  Dose: 5 mg     senna-docusate 8.6-50 MG Tabs  Commonly known as: PERICOLACE or SENOKOT S   Take 2 Tablets by mouth 2 times a day.  Dose: 2 Tablet        CONTINUE taking these medications      Instructions   ZOLOFT PO   Take 60 mg by mouth  every morning.  Dose: 60 mg            Allergies  No Known Allergies    DIET  Orders Placed This Encounter   Procedures   • Diet Order Diet: Clear Liquid     Standing Status:   Standing     Number of Occurrences:   1     Order Specific Question:   Diet:     Answer:   Clear Liquid [10]       ACTIVITY  As tolerated.  Weight bearing as tolerated    CONSULTATIONS  Oral Surgery    PROCEDURES  4/13: 1.  Incision and drainage of right mandibular vestibule abscess  2.  Extraction of teeth numbers 30 and 32    LABORATORY  Lab Results   Component Value Date    SODIUM 131 (L) 04/14/2022    POTASSIUM 5.0 04/14/2022    CHLORIDE 100 04/14/2022    CO2 22 04/14/2022    GLUCOSE 135 (H) 04/14/2022    BUN 20 04/14/2022    CREATININE 0.51 04/14/2022        Lab Results   Component Value Date    WBC 5.9 04/14/2022    HEMOGLOBIN 14.7 04/14/2022    HEMATOCRIT 44.8 04/14/2022    PLATELETCT 89 (L) 04/14/2022        Total time of the discharge process exceeds 31 minutes.    CURRY Hoyos.

## 2022-04-14 NOTE — ANESTHESIA TIME REPORT
Anesthesia Start and Stop Event Times     Date Time Event    4/13/2022 1711 Ready for Procedure     1730 Anesthesia Start     1828 Anesthesia Stop        Responsible Staff  04/13/22    Name Role Begin End    Iban Manzanares M.D. Anesth 1730 1828        Overtime Reason:  no overtime (within assigned shift)    Comments:

## 2022-04-14 NOTE — OP REPORT
PreOp Diagnosis:  1. Right buccal and submandibular space cellulitis  2. Right mandibular vestibule abscess  3. Advanced unrestorable decay of teeth numbers 30 and 32    Date of Surgery: 4/13/22  Surgeon: Nohelia Estes DDS, MD  Assistant: N/A    PostOp Diagnosis: Same as PreOp Diagnosis  Procedure:  1.  Incision and drainage of right mandibular vestibule abscess  2.  Extraction of teeth numbers 30 and 32    Drains: None  Specimens: None    DESCRIPTION OF OPERATIVE PROCEDURE:  The patient was brought to OR 10.  They were transferred to the operating room table and placed in a supine position.  General anesthesia was induced and an oral endotracheal tube secured without difficulty.  Extremities were padded and tucked.  A timeout was performed which correctly identified the patient and procedure.  The oral cavity was draped in the usual fashion for an oral surgical procedure.  A bite block was inserted and a throat pack was placed.  Bupivacaine 0.5% with 1:200,000 epinephrine was injected.    A 15 blade was used to make a buccal sulcular incision around teeth numbers 30 through 32.  A full-thickness mucoperiosteal flap was reflected to expose the alveolar crest.  Immediately purulence was expressed from the vestibular abscess by tooth #30.  Further subperiosteal blunt dissection was carried forth into the abscess pocket utilizing a 9 periosteal elevator with care to avoid the mental foramen.  A handpiece and 701 bur were used to suction tooth #30 and remove bone around both teeth numbers 30 and 32.  The teeth were then luxated with an elevator and extracted in pieces.  The sockets were curetted and the bone filed.  The abscess cavity and sockets were copiously irrigated with normal saline.  The gingiva was reapproximated with 4-0 chromic gut suture in simple interrupted fashion.    The oropharynx was suctioned free of all debris and the throat pack and bite block removed.  Counts were correct.  A 4 x 4 dressing was  placed over the extraction sites.  The patient was allowed to emerge from anesthesia and extubated.  There were no complications identified.  The patient tolerated the procedure and anesthesia well and was taken to PACU in stable condition.     Disposition: The patient will be admitted to hospitalist service for postoperative cares and can be discharged when meeting criteria.     1.  Bite on gauze for 30 minutes.  Repeat as needed for bleeding.  2.  Beginning postop day 1, oral saline rinses 3 times daily after meals.  3.  Continue IV antibiotics (Unasyn) while in-house and transition to Augmentin 875/125 mg twice daily for 7 days on discharge.  4.  Cover drains with gauze and netting or tape, nonocclusive.  5.  Soft diet for 1 week.  6.  Follow-up with me as needed

## 2022-04-14 NOTE — OR SURGEON
Immediate Post OP Note    PreOp Diagnosis:  #1 Right buccal and submandibular space cellulitis  #2 Right mandibular vestibule abscess  #3 Advanced unrestorable decay of teeth numbers 30 and 32      PostOp Diagnosis:   #1 Right buccal and submandibular space cellulitis  #2 Right mandibular vestibule abscess  #3 Advanced unrestorable decay of teeth numbers 30 and 32      Procedure(s):  INCISION AND DRAINAGE, ABSCESS, SUBMANDIBULAR REGION TOOTH EXTRACTION - Wound Class: Dirty or Infected    Surgeon(s):  Nohelia Estes D.D.S., M.D.    Anesthesiologist/Type of Anesthesia:  Anesthesiologist: Iban Manzanares M.D./General    Surgical Staff:  Circulator: Landy Jefferson R.N.  Relief Circulator: Elton Palacios R.N.  Scrub Person: Aishwarya Zamorano    Specimens removed if any:  * No specimens in log *    Estimated Blood Loss: <10 mL    Findings: Right mandibular vestibule abscess with exuberant edema of the right lower lip/buccal space    Complications: None.        4/13/2022 10:01 PM Nohelia Estes D.D.SAlton

## (undated) DEVICE — CHLORAPREP 26 ML APPLICATOR - ORANGE TINT(25/CA)

## (undated) DEVICE — PACK MINOR BASIN - (2EA/CA)

## (undated) DEVICE — SWAB ANAEROBIC SPEC.COLLECTOR - (25/PK 4PK/CA 100EA/CA)

## (undated) DEVICE — CANISTER SUCTION 3000ML MECHANICAL FILTER AUTO SHUTOFF MEDI-VAC NONSTERILE LF DISP  (40EA/CA)

## (undated) DEVICE — MASK ANESTHESIA ADULT  - (100/CA)

## (undated) DEVICE — SLEEVE VASO CALF MED - (10PR/CA)

## (undated) DEVICE — GOWN WARMING STANDARD FLEX - (30/CA)

## (undated) DEVICE — BOVIE BLADE COATED - (50/PK)

## (undated) DEVICE — GLOVE BIOGEL ECLIPSE PF LATEX SIZE 9.0

## (undated) DEVICE — GOWN SURGICAL X-LARGE ULTRA - FILM-REINFORCED (20/CA)

## (undated) DEVICE — GLOVE BIOGEL SZ 6 PF LATEX - (50EA/BX 4BX/CA)

## (undated) DEVICE — SUTURE GENERAL

## (undated) DEVICE — SENSOR SPO2 NEO LNCS ADHESIVE (20/BX) SEE USER NOTES

## (undated) DEVICE — TOWEL STOP TIMEOUT SAFETY FLAG (40EA/CA)

## (undated) DEVICE — SYRINGE 10 ML CONTROL LL (25EA/BX 4BX/CA)

## (undated) DEVICE — GLOVE BIOGEL SZ 6.5 SURGICAL PF LTX (50PR/BX 4BX/CA)

## (undated) DEVICE — GLOVE BIOGEL INDICATOR SZ 7SURGICAL PF LTX - (50/BX 4BX/CA)

## (undated) DEVICE — BLADE SURGICAL #11 - (50/BX)

## (undated) DEVICE — SYRINGE 30 ML LL (56/BX)

## (undated) DEVICE — SET LEADWIRE 5 LEAD BEDSIDE DISPOSABLE ECG (1SET OF 5/EA)

## (undated) DEVICE — DRAPE SURGICAL U 77X120 - (10/CA)

## (undated) DEVICE — CORDS BIPOLAR COAGULATION - 12FT STERILE DISP. (10EA/BX)

## (undated) DEVICE — ELECTRODE DUAL RETURN W/ CORD - (50/PK)

## (undated) DEVICE — SPONGE PEANUT - (5/PK 50PK/CA)

## (undated) DEVICE — SET EXTENSION WITH 2 PORTS (48EA/CA) ***PART #2C8610 IS A SUBSTITUTE*****

## (undated) DEVICE — NEEDLE NON SAFETY HYPO 22 GA X 1 1/2 IN (100/BX)

## (undated) DEVICE — SUTURE 4-0 PROLENE PS-2 18 (36PK/BX)"

## (undated) DEVICE — SUCTION INSTRUMENT YANKAUER BULBOUS TIP W/O VENT (50EA/CA)

## (undated) DEVICE — BUR 701 1.2 (ORAL)

## (undated) DEVICE — TOWELS CLOTH SURGICAL - (4/PK 20PK/CA)

## (undated) DEVICE — GLOVE BIOGEL INDICATOR SZ 6.5 SURGICAL PF LTX - (50PR/BX 4BX/CA)

## (undated) DEVICE — TUBING CLEARLINK DUO-VENT - C-FLO (48EA/CA)

## (undated) DEVICE — NEPTUNE 4 PORT MANIFOLD - (20/PK)

## (undated) DEVICE — KIT ANESTHESIA W/CIRCUIT & 3/LT BAG W/FILTER (20EA/CA)

## (undated) DEVICE — DRAPE LARGE 3 QUARTER - (20/CA)

## (undated) DEVICE — GLOVE BIOGEL ECLIPSE  PF LATEX SIZE 6.5 (50PR/BX)

## (undated) DEVICE — PROTECTOR ULNA NERVE - (36PR/CA)

## (undated) DEVICE — HEAD HOLDER JUNIOR/ADULT

## (undated) DEVICE — ELECTRODE 850 FOAM ADHESIVE - HYDROGEL RADIOTRNSPRNT (50/PK)

## (undated) DEVICE — BANDAGE ROLL STERILE BULKEE 4.5 IN X 4 YD (100EA/CA)

## (undated) DEVICE — LACTATED RINGERS INJ 1000 ML - (14EA/CA 60CA/PF)

## (undated) DEVICE — SWAB CULTURE AMIES ESWAB (50EA/PK)

## (undated) DEVICE — BAG SPONGE COUNT 10.25 X 32 - BLUE (250/CA)